# Patient Record
Sex: FEMALE | Race: WHITE | NOT HISPANIC OR LATINO | Employment: FULL TIME | URBAN - NONMETROPOLITAN AREA
[De-identification: names, ages, dates, MRNs, and addresses within clinical notes are randomized per-mention and may not be internally consistent; named-entity substitution may affect disease eponyms.]

---

## 2018-03-13 ENCOUNTER — LAB (OUTPATIENT)
Dept: LAB | Facility: HOSPITAL | Age: 58
End: 2018-03-13

## 2018-03-13 ENCOUNTER — TRANSCRIBE ORDERS (OUTPATIENT)
Dept: ADMINISTRATIVE | Facility: HOSPITAL | Age: 58
End: 2018-03-13

## 2018-03-13 DIAGNOSIS — I13.10 BENIGN HYPERTENSIVE HEART AND RENAL DISEASE: ICD-10-CM

## 2018-03-13 DIAGNOSIS — I13.10 BENIGN HYPERTENSIVE HEART AND RENAL DISEASE: Primary | ICD-10-CM

## 2018-03-13 LAB
ALBUMIN SERPL-MCNC: 4.5 G/DL (ref 3.5–5)
ANION GAP SERPL CALCULATED.3IONS-SCNC: 19.7 MMOL/L
BACTERIA UR QL AUTO: ABNORMAL /HPF
BASOPHILS # BLD AUTO: 0.05 10*3/MM3 (ref 0–0.2)
BASOPHILS NFR BLD AUTO: 0.5 % (ref 0–2.5)
BILIRUB UR QL STRIP: NEGATIVE
BUN BLD-MCNC: 15 MG/DL (ref 7–20)
BUN/CREAT SERPL: 12.5 (ref 7.1–23.5)
CALCIUM SPEC-SCNC: 9.8 MG/DL (ref 8.4–10.2)
CHLORIDE SERPL-SCNC: 99 MMOL/L (ref 98–107)
CLARITY UR: CLEAR
CO2 SERPL-SCNC: 30 MMOL/L (ref 26–30)
COLOR UR: YELLOW
CREAT BLD-MCNC: 1.2 MG/DL (ref 0.6–1.3)
CREAT UR-MCNC: 76.2 MG/DL
DEPRECATED RDW RBC AUTO: 41.2 FL (ref 37–54)
EOSINOPHIL # BLD AUTO: 0.16 10*3/MM3 (ref 0–0.7)
EOSINOPHIL NFR BLD AUTO: 1.6 % (ref 0–7)
ERYTHROCYTE [DISTWIDTH] IN BLOOD BY AUTOMATED COUNT: 12.9 % (ref 11.5–14.5)
GFR SERPL CREATININE-BSD FRML MDRD: 46 ML/MIN/1.73
GLUCOSE BLD-MCNC: 88 MG/DL (ref 74–98)
GLUCOSE UR STRIP-MCNC: NEGATIVE MG/DL
HCT VFR BLD AUTO: 34.1 % (ref 37–47)
HGB BLD-MCNC: 11.2 G/DL (ref 12–16)
HGB UR QL STRIP.AUTO: NEGATIVE
HYALINE CASTS UR QL AUTO: ABNORMAL /LPF
IMM GRANULOCYTES # BLD: 0.06 10*3/MM3 (ref 0–0.06)
IMM GRANULOCYTES NFR BLD: 0.6 % (ref 0–0.6)
KETONES UR QL STRIP: NEGATIVE
LEUKOCYTE ESTERASE UR QL STRIP.AUTO: NEGATIVE
LYMPHOCYTES # BLD AUTO: 2.17 10*3/MM3 (ref 0.6–3.4)
LYMPHOCYTES NFR BLD AUTO: 22.1 % (ref 10–50)
MCH RBC QN AUTO: 28.9 PG (ref 27–31)
MCHC RBC AUTO-ENTMCNC: 32.8 G/DL (ref 30–37)
MCV RBC AUTO: 88.1 FL (ref 81–99)
MONOCYTES # BLD AUTO: 0.51 10*3/MM3 (ref 0–0.9)
MONOCYTES NFR BLD AUTO: 5.2 % (ref 0–12)
NEUTROPHILS # BLD AUTO: 6.89 10*3/MM3 (ref 2–6.9)
NEUTROPHILS NFR BLD AUTO: 70 % (ref 37–80)
NITRITE UR QL STRIP: NEGATIVE
NRBC BLD MANUAL-RTO: 0 /100 WBC (ref 0–0)
PH UR STRIP.AUTO: <=5 [PH] (ref 5–8)
PHOSPHATE SERPL-MCNC: 4.8 MG/DL (ref 2.5–4.5)
PLATELET # BLD AUTO: 354 10*3/MM3 (ref 130–400)
PMV BLD AUTO: 9.5 FL (ref 6–12)
POTASSIUM BLD-SCNC: 3.7 MMOL/L (ref 3.5–5.1)
PROT UR QL STRIP: NEGATIVE
PROT UR-MCNC: 10 MG/DL (ref 0–11.9)
RBC # BLD AUTO: 3.87 10*6/MM3 (ref 4.2–5.4)
RBC # UR: ABNORMAL /HPF
REF LAB TEST METHOD: ABNORMAL
SODIUM BLD-SCNC: 145 MMOL/L (ref 137–145)
SP GR UR STRIP: 1.01 (ref 1–1.03)
SQUAMOUS #/AREA URNS HPF: ABNORMAL /HPF
UROBILINOGEN UR QL STRIP: NORMAL
WBC NRBC COR # BLD: 9.84 10*3/MM3 (ref 4.8–10.8)
WBC UR QL AUTO: ABNORMAL /HPF

## 2018-03-13 PROCEDURE — 84156 ASSAY OF PROTEIN URINE: CPT

## 2018-03-13 PROCEDURE — 80069 RENAL FUNCTION PANEL: CPT | Performed by: INTERNAL MEDICINE

## 2018-03-13 PROCEDURE — 85025 COMPLETE CBC W/AUTO DIFF WBC: CPT | Performed by: INTERNAL MEDICINE

## 2018-03-13 PROCEDURE — 81001 URINALYSIS AUTO W/SCOPE: CPT

## 2018-03-13 PROCEDURE — 36415 COLL VENOUS BLD VENIPUNCTURE: CPT | Performed by: INTERNAL MEDICINE

## 2018-03-13 PROCEDURE — 82570 ASSAY OF URINE CREATININE: CPT

## 2018-03-20 ENCOUNTER — TRANSCRIBE ORDERS (OUTPATIENT)
Dept: ULTRASOUND IMAGING | Facility: HOSPITAL | Age: 58
End: 2018-03-20

## 2018-03-20 DIAGNOSIS — N18.30 CHRONIC KIDNEY DISEASE, STAGE III (MODERATE) (HCC): Primary | ICD-10-CM

## 2018-03-22 ENCOUNTER — HOSPITAL ENCOUNTER (OUTPATIENT)
Dept: ULTRASOUND IMAGING | Facility: HOSPITAL | Age: 58
Discharge: HOME | End: 2018-03-22
Admitting: INTERNAL MEDICINE

## 2018-03-22 DIAGNOSIS — N18.30 CHRONIC KIDNEY DISEASE, STAGE III (MODERATE) (HCC): ICD-10-CM

## 2018-03-22 PROCEDURE — 76775 US EXAM ABDO BACK WALL LIM: CPT

## 2018-05-24 ENCOUNTER — APPOINTMENT (OUTPATIENT)
Dept: LAB | Facility: HOSPITAL | Age: 58
End: 2018-05-24

## 2018-05-24 ENCOUNTER — TRANSCRIBE ORDERS (OUTPATIENT)
Dept: ADMINISTRATIVE | Facility: HOSPITAL | Age: 58
End: 2018-05-24

## 2018-05-24 DIAGNOSIS — N18.30 CHRONIC KIDNEY DISEASE, STAGE III (MODERATE) (HCC): Primary | ICD-10-CM

## 2018-05-24 LAB
25(OH)D3 SERPL-MCNC: 33.2 NG/ML
ALBUMIN SERPL-MCNC: 4.6 G/DL (ref 3.5–5)
AMORPH URATE CRY URNS QL MICRO: ABNORMAL /HPF
ANION GAP SERPL CALCULATED.3IONS-SCNC: 17.6 MMOL/L (ref 10–20)
BACTERIA UR QL AUTO: ABNORMAL /HPF
BILIRUB UR QL STRIP: ABNORMAL
BUN BLD-MCNC: 27 MG/DL (ref 7–20)
BUN/CREAT SERPL: 24.5 (ref 7.1–23.5)
CALCIUM SPEC-SCNC: 9.4 MG/DL (ref 8.4–10.2)
CHLORIDE SERPL-SCNC: 98 MMOL/L (ref 98–107)
CHOLEST SERPL-MCNC: 163 MG/DL (ref 0–199)
CLARITY UR: CLEAR
CO2 SERPL-SCNC: 28 MMOL/L (ref 26–30)
COLOR UR: YELLOW
CREAT BLD-MCNC: 1.1 MG/DL (ref 0.6–1.3)
CREAT UR-MCNC: 230.6 MG/DL
FERRITIN SERPL-MCNC: 22.7 NG/ML (ref 11.1–264)
FOLATE SERPL-MCNC: 11.6 NG/ML
GFR SERPL CREATININE-BSD FRML MDRD: 51 ML/MIN/1.73
GLUCOSE BLD-MCNC: 149 MG/DL (ref 74–98)
GLUCOSE UR STRIP-MCNC: NEGATIVE MG/DL
HBA1C MFR BLD: 6.4 % (ref 3–6)
HDLC SERPL-MCNC: 33 MG/DL (ref 40–60)
HGB UR QL STRIP.AUTO: NEGATIVE
HYALINE CASTS UR QL AUTO: ABNORMAL /LPF
IRON 24H UR-MRATE: 32 MCG/DL (ref 37–181)
IRON SATN MFR SERPL: 8 % (ref 11–46)
KETONES UR QL STRIP: ABNORMAL
LDLC SERPL CALC-MCNC: 87 MG/DL (ref 0–99)
LDLC/HDLC SERPL: 2.64 {RATIO}
LEUKOCYTE ESTERASE UR QL STRIP.AUTO: NEGATIVE
MUCOUS THREADS URNS QL MICRO: ABNORMAL /HPF
NITRITE UR QL STRIP: NEGATIVE
PH UR STRIP.AUTO: <=5 [PH] (ref 5–8)
PHOSPHATE SERPL-MCNC: 4.1 MG/DL (ref 2.5–4.5)
POTASSIUM BLD-SCNC: 3.6 MMOL/L (ref 3.5–5.1)
PROT UR QL STRIP: NEGATIVE
RBC # UR: ABNORMAL /HPF
REF LAB TEST METHOD: ABNORMAL
SODIUM BLD-SCNC: 140 MMOL/L (ref 137–145)
SP GR UR STRIP: 1.02 (ref 1–1.03)
SQUAMOUS #/AREA URNS HPF: ABNORMAL /HPF
TIBC SERPL-MCNC: 401 MCG/DL (ref 261–497)
TRIGL SERPL-MCNC: 215 MG/DL
UROBILINOGEN UR QL STRIP: ABNORMAL
VIT B12 BLD-MCNC: 355 PG/ML (ref 239–931)
VLDLC SERPL-MCNC: 43 MG/DL
WBC UR QL AUTO: ABNORMAL /HPF

## 2018-05-24 PROCEDURE — 80061 LIPID PANEL: CPT | Performed by: INTERNAL MEDICINE

## 2018-05-24 PROCEDURE — 84156 ASSAY OF PROTEIN URINE: CPT | Performed by: INTERNAL MEDICINE

## 2018-05-24 PROCEDURE — 82570 ASSAY OF URINE CREATININE: CPT | Performed by: INTERNAL MEDICINE

## 2018-05-24 PROCEDURE — 83970 ASSAY OF PARATHORMONE: CPT | Performed by: INTERNAL MEDICINE

## 2018-05-24 PROCEDURE — 86334 IMMUNOFIX E-PHORESIS SERUM: CPT | Performed by: INTERNAL MEDICINE

## 2018-05-24 PROCEDURE — 82043 UR ALBUMIN QUANTITATIVE: CPT | Performed by: INTERNAL MEDICINE

## 2018-05-24 PROCEDURE — 82784 ASSAY IGA/IGD/IGG/IGM EACH: CPT | Performed by: INTERNAL MEDICINE

## 2018-05-24 PROCEDURE — 82306 VITAMIN D 25 HYDROXY: CPT | Performed by: INTERNAL MEDICINE

## 2018-05-24 PROCEDURE — 81001 URINALYSIS AUTO W/SCOPE: CPT | Performed by: INTERNAL MEDICINE

## 2018-05-24 PROCEDURE — 83540 ASSAY OF IRON: CPT | Performed by: INTERNAL MEDICINE

## 2018-05-24 PROCEDURE — 83036 HEMOGLOBIN GLYCOSYLATED A1C: CPT | Performed by: INTERNAL MEDICINE

## 2018-05-24 PROCEDURE — 84166 PROTEIN E-PHORESIS/URINE/CSF: CPT | Performed by: INTERNAL MEDICINE

## 2018-05-24 PROCEDURE — 83550 IRON BINDING TEST: CPT | Performed by: INTERNAL MEDICINE

## 2018-05-24 PROCEDURE — 86335 IMMUNFIX E-PHORSIS/URINE/CSF: CPT | Performed by: INTERNAL MEDICINE

## 2018-05-24 PROCEDURE — 80069 RENAL FUNCTION PANEL: CPT | Performed by: INTERNAL MEDICINE

## 2018-05-24 PROCEDURE — 82607 VITAMIN B-12: CPT | Performed by: INTERNAL MEDICINE

## 2018-05-24 PROCEDURE — 84165 PROTEIN E-PHORESIS SERUM: CPT | Performed by: INTERNAL MEDICINE

## 2018-05-24 PROCEDURE — 84155 ASSAY OF PROTEIN SERUM: CPT | Performed by: INTERNAL MEDICINE

## 2018-05-24 PROCEDURE — 36415 COLL VENOUS BLD VENIPUNCTURE: CPT | Performed by: INTERNAL MEDICINE

## 2018-05-24 PROCEDURE — 82746 ASSAY OF FOLIC ACID SERUM: CPT | Performed by: INTERNAL MEDICINE

## 2018-05-24 PROCEDURE — 82728 ASSAY OF FERRITIN: CPT | Performed by: INTERNAL MEDICINE

## 2018-05-25 LAB
ALBUMIN SERPL-MCNC: 3.8 G/DL (ref 2.9–4.4)
ALBUMIN/GLOB SERPL: 1.2 {RATIO} (ref 0.7–1.7)
ALPHA1 GLOB FLD ELPH-MCNC: 0.3 G/DL (ref 0–0.4)
ALPHA2 GLOB SERPL ELPH-MCNC: 0.8 G/DL (ref 0.4–1)
B-GLOBULIN SERPL ELPH-MCNC: 1.2 G/DL (ref 0.7–1.3)
GAMMA GLOB SERPL ELPH-MCNC: 1.1 G/DL (ref 0.4–1.8)
GLOBULIN SER CALC-MCNC: 3.4 G/DL (ref 2.2–3.9)
IGA SERPL-MCNC: 153 MG/DL (ref 87–352)
IGG SERPL-MCNC: 905 MG/DL (ref 700–1600)
IGM SERPL-MCNC: 171 MG/DL (ref 26–217)
INTERPRETATION SERPL IEP-IMP: NORMAL
Lab: NORMAL
M-SPIKE: NORMAL G/DL
MICROALBUMIN UR-MCNC: 15.7 UG/ML
PROT SERPL-MCNC: 7.2 G/DL (ref 6–8.5)
PTH-INTACT SERPL-MCNC: 27 PG/ML (ref 15–65)

## 2018-05-29 LAB
ALBUMIN 24H MFR UR ELPH: 48.8 %
ALPHA1 GLOB 24H MFR UR ELPH: 2.2 %
ALPHA2 GLOB 24H MFR UR ELPH: 10.2 %
B-GLOBULIN MFR UR ELPH: 23.2 %
GAMMA GLOB 24H MFR UR ELPH: 15.5 %
HIV 1 & 2 AB SER-IMP: NORMAL
INTERPRETATION UR IFE-IMP: NORMAL
M PROTEIN 24H MFR UR ELPH: NORMAL %
PROT UR-MCNC: 7.9 MG/DL

## 2018-06-06 PROBLEM — D50.9 IRON DEFICIENCY ANEMIA: Status: ACTIVE | Noted: 2018-06-06

## 2018-06-06 PROBLEM — N18.30 CKD (CHRONIC KIDNEY DISEASE), STAGE III: Status: ACTIVE | Noted: 2018-06-06

## 2018-06-07 ENCOUNTER — HOSPITAL ENCOUNTER (OUTPATIENT)
Dept: INFUSION THERAPY | Facility: HOSPITAL | Age: 58
Setting detail: INFUSION SERIES
Discharge: HOME | End: 2018-06-07

## 2018-06-07 VITALS
WEIGHT: 187 LBS | DIASTOLIC BLOOD PRESSURE: 61 MMHG | RESPIRATION RATE: 16 BRPM | BODY MASS INDEX: 31.16 KG/M2 | TEMPERATURE: 97.8 F | HEART RATE: 68 BPM | SYSTOLIC BLOOD PRESSURE: 100 MMHG | HEIGHT: 65 IN | OXYGEN SATURATION: 97 %

## 2018-06-07 DIAGNOSIS — N18.30 CKD (CHRONIC KIDNEY DISEASE), STAGE III (HCC): ICD-10-CM

## 2018-06-07 DIAGNOSIS — D50.9 IRON DEFICIENCY ANEMIA, UNSPECIFIED IRON DEFICIENCY ANEMIA TYPE: ICD-10-CM

## 2018-06-07 PROCEDURE — 96374 THER/PROPH/DIAG INJ IV PUSH: CPT

## 2018-06-07 PROCEDURE — 25010000002 FERUMOXYTOL 510 MG/17ML SOLUTION 510 MG VIAL: Performed by: INTERNAL MEDICINE

## 2018-06-07 RX ORDER — ATORVASTATIN CALCIUM 40 MG/1
40 TABLET, FILM COATED ORAL DAILY
COMMUNITY
End: 2021-10-11 | Stop reason: HOSPADM

## 2018-06-07 RX ORDER — LOPERAMIDE HYDROCHLORIDE 2 MG/1
2 CAPSULE ORAL 4 TIMES DAILY PRN
COMMUNITY

## 2018-06-07 RX ORDER — RABEPRAZOLE SODIUM 20 MG/1
20 TABLET, DELAYED RELEASE ORAL 2 TIMES DAILY
COMMUNITY

## 2018-06-07 RX ORDER — VALACYCLOVIR HYDROCHLORIDE 1 G/1
1000 TABLET, FILM COATED ORAL 2 TIMES DAILY PRN
COMMUNITY

## 2018-06-07 RX ORDER — GABAPENTIN 300 MG/1
300 CAPSULE ORAL 3 TIMES DAILY
COMMUNITY
End: 2021-10-11 | Stop reason: HOSPADM

## 2018-06-07 RX ORDER — FLUOXETINE HYDROCHLORIDE 20 MG/1
60 CAPSULE ORAL DAILY
COMMUNITY
End: 2022-03-10

## 2018-06-07 RX ORDER — SODIUM CHLORIDE 9 MG/ML
250 INJECTION, SOLUTION INTRAVENOUS ONCE
Status: CANCELLED | OUTPATIENT
Start: 2018-06-07

## 2018-06-07 RX ORDER — LISINOPRIL 10 MG/1
10 TABLET ORAL DAILY
Status: ON HOLD | COMMUNITY
End: 2021-10-11 | Stop reason: SDUPTHER

## 2018-06-07 RX ORDER — CLOBETASOL PROPIONATE 0.5 MG/G
OINTMENT TOPICAL AS NEEDED
COMMUNITY

## 2018-06-07 RX ORDER — ESTRADIOL 0.07 MG/D
1 FILM, EXTENDED RELEASE TRANSDERMAL 2 TIMES WEEKLY
COMMUNITY

## 2018-06-07 RX ORDER — SODIUM CHLORIDE 9 MG/ML
250 INJECTION, SOLUTION INTRAVENOUS ONCE
Status: DISCONTINUED | OUTPATIENT
Start: 2018-06-07 | End: 2018-06-09 | Stop reason: HOSPADM

## 2018-06-07 RX ORDER — FENOFIBRATE 54 MG/1
54 TABLET ORAL DAILY
COMMUNITY
End: 2022-03-10

## 2018-06-07 RX ORDER — FLUTICASONE PROPIONATE 50 MCG
2 SPRAY, SUSPENSION (ML) NASAL DAILY
COMMUNITY

## 2018-06-07 RX ORDER — LEVOTHYROXINE SODIUM 0.07 MG/1
75 TABLET ORAL DAILY
COMMUNITY

## 2018-06-07 RX ORDER — CETIRIZINE HYDROCHLORIDE 10 MG/1
10 TABLET ORAL 2 TIMES DAILY
COMMUNITY

## 2018-06-07 RX ADMIN — FERUMOXYTOL 510 MG: 510 INJECTION INTRAVENOUS at 14:07

## 2018-06-07 NOTE — PATIENT INSTRUCTIONS
Post IV Iron Infusion    Notify your doctor/practitioner or come to the emergency room if the following occurs:    1.  Rash  2.  Nausea and vomiting  3.  Diarrhea   4.  Blood in your urine  5.  Rapid heartbeat   6.  Chest pain  7.  Breathing difficulty  8.  Chest pain / tightness  9.  Any other unusual symptoms  10.  Decrease of blood pressure (if Ferumoxytol was given)          Ferumoxytol injection  What is this medicine?  FERUMOXYTOL is an iron complex. Iron is used to make healthy red blood cells, which carry oxygen and nutrients throughout the body. This medicine is used to treat iron deficiency anemia in people with chronic kidney disease.  This medicine may be used for other purposes; ask your health care provider or pharmacist if you have questions.  COMMON BRAND NAME(S): Feraheme  What should I tell my health care provider before I take this medicine?  They need to know if you have any of these conditions:  -anemia not caused by low iron levels  -high levels of iron in the blood  -magnetic resonance imaging (MRI) test scheduled  -an unusual or allergic reaction to iron, other medicines, foods, dyes, or preservatives  -pregnant or trying to get pregnant  -breast-feeding  How should I use this medicine?  This medicine is for injection into a vein. It is given by a health care professional in a hospital or clinic setting.  Talk to your pediatrician regarding the use of this medicine in children. Special care may be needed.  Overdosage: If you think you have taken too much of this medicine contact a poison control center or emergency room at once.  NOTE: This medicine is only for you. Do not share this medicine with others.  What if I miss a dose?  It is important not to miss your dose. Call your doctor or health care professional if you are unable to keep an appointment.  What may interact with this medicine?  This medicine may interact with the following medications:  -other iron products  This list may not  describe all possible interactions. Give your health care provider a list of all the medicines, herbs, non-prescription drugs, or dietary supplements you use. Also tell them if you smoke, drink alcohol, or use illegal drugs. Some items may interact with your medicine.  What should I watch for while using this medicine?  Visit your doctor or healthcare professional regularly. Tell your doctor or healthcare professional if your symptoms do not start to get better or if they get worse. You may need blood work done while you are taking this medicine.  You may need to follow a special diet. Talk to your doctor. Foods that contain iron include: whole grains/cereals, dried fruits, beans, or peas, leafy green vegetables, and organ meats (liver, kidney).  What side effects may I notice from receiving this medicine?  Side effects that you should report to your doctor or health care professional as soon as possible:  -allergic reactions like skin rash, itching or hives, swelling of the face, lips, or tongue  -breathing problems  -changes in blood pressure  -feeling faint or lightheaded, falls  -fever or chills  -flushing, sweating, or hot feelings  -swelling of the ankles or feet  Side effects that usually do not require medical attention (report to your doctor or health care professional if they continue or are bothersome):  -diarrhea  -headache  -nausea, vomiting  -stomach pain  This list may not describe all possible side effects. Call your doctor for medical advice about side effects. You may report side effects to FDA at 3-763-FDA-7928.  Where should I keep my medicine?  This drug is given in a hospital or clinic and will not be stored at home.  NOTE: This sheet is a summary. It may not cover all possible information. If you have questions about this medicine, talk to your doctor, pharmacist, or health care provider.  © 2018 Elsevier/Gold Standard (2017-01-19 12:41:49)

## 2018-06-11 ENCOUNTER — HOSPITAL ENCOUNTER (OUTPATIENT)
Dept: INFUSION THERAPY | Facility: HOSPITAL | Age: 58
Setting detail: INFUSION SERIES
Discharge: HOME | End: 2018-06-11

## 2018-06-11 VITALS
SYSTOLIC BLOOD PRESSURE: 104 MMHG | RESPIRATION RATE: 20 BRPM | OXYGEN SATURATION: 96 % | HEART RATE: 74 BPM | DIASTOLIC BLOOD PRESSURE: 53 MMHG | TEMPERATURE: 98.9 F

## 2018-06-11 DIAGNOSIS — N18.30 CKD (CHRONIC KIDNEY DISEASE), STAGE III (HCC): ICD-10-CM

## 2018-06-11 DIAGNOSIS — D50.9 IRON DEFICIENCY ANEMIA, UNSPECIFIED IRON DEFICIENCY ANEMIA TYPE: ICD-10-CM

## 2018-06-11 PROCEDURE — 96374 THER/PROPH/DIAG INJ IV PUSH: CPT

## 2018-06-11 PROCEDURE — 25010000002 FERUMOXYTOL 510 MG/17ML SOLUTION 510 MG VIAL: Performed by: INTERNAL MEDICINE

## 2018-06-11 RX ORDER — SODIUM CHLORIDE 9 MG/ML
250 INJECTION, SOLUTION INTRAVENOUS ONCE
Status: DISCONTINUED | OUTPATIENT
Start: 2018-06-11 | End: 2018-06-13 | Stop reason: HOSPADM

## 2018-06-11 RX ORDER — SODIUM CHLORIDE 9 MG/ML
250 INJECTION, SOLUTION INTRAVENOUS ONCE
Status: CANCELLED | OUTPATIENT
Start: 2018-06-11

## 2018-06-11 RX ADMIN — FERUMOXYTOL 510 MG: 510 INJECTION INTRAVENOUS at 10:45

## 2018-07-03 ENCOUNTER — HOSPITAL ENCOUNTER (OUTPATIENT)
Dept: ULTRASOUND IMAGING | Facility: HOSPITAL | Age: 58
Discharge: HOME | End: 2018-07-03
Admitting: PHYSICIAN ASSISTANT

## 2018-07-03 ENCOUNTER — TRANSCRIBE ORDERS (OUTPATIENT)
Dept: ADMINISTRATIVE | Facility: HOSPITAL | Age: 58
End: 2018-07-03

## 2018-07-03 DIAGNOSIS — R22.9 LOCALIZED SUPERFICIAL SWELLING, MASS, OR LUMP: ICD-10-CM

## 2018-07-03 DIAGNOSIS — R22.9 LOCALIZED SUPERFICIAL SWELLING, MASS, OR LUMP: Primary | ICD-10-CM

## 2018-07-03 PROCEDURE — 76999 ECHO EXAMINATION PROCEDURE: CPT

## 2018-07-23 ENCOUNTER — TRANSCRIBE ORDERS (OUTPATIENT)
Dept: ADMINISTRATIVE | Facility: HOSPITAL | Age: 58
End: 2018-07-23

## 2018-07-23 ENCOUNTER — HOSPITAL ENCOUNTER (OUTPATIENT)
Dept: GENERAL RADIOLOGY | Facility: HOSPITAL | Age: 58
Discharge: HOME | End: 2018-07-23

## 2018-07-23 ENCOUNTER — HOSPITAL ENCOUNTER (OUTPATIENT)
Dept: GENERAL RADIOLOGY | Facility: HOSPITAL | Age: 58
Discharge: HOME | End: 2018-07-23
Admitting: ANESTHESIOLOGY

## 2018-07-23 DIAGNOSIS — M25.561 PAIN IN BOTH KNEES, UNSPECIFIED CHRONICITY: Primary | ICD-10-CM

## 2018-07-23 DIAGNOSIS — M25.562 PAIN IN BOTH KNEES, UNSPECIFIED CHRONICITY: Primary | ICD-10-CM

## 2018-07-23 PROCEDURE — 73562 X-RAY EXAM OF KNEE 3: CPT

## 2018-12-04 ENCOUNTER — LAB (OUTPATIENT)
Dept: LAB | Facility: HOSPITAL | Age: 58
End: 2018-12-04

## 2018-12-04 ENCOUNTER — TRANSCRIBE ORDERS (OUTPATIENT)
Dept: ADMINISTRATIVE | Facility: HOSPITAL | Age: 58
End: 2018-12-04

## 2018-12-04 DIAGNOSIS — N18.30 CHRONIC KIDNEY DISEASE, STAGE III (MODERATE) (HCC): Primary | ICD-10-CM

## 2018-12-04 DIAGNOSIS — N18.30 CHRONIC KIDNEY DISEASE, STAGE III (MODERATE) (HCC): ICD-10-CM

## 2018-12-04 LAB
ALBUMIN SERPL-MCNC: 4.7 G/DL (ref 3.5–5)
ANION GAP SERPL CALCULATED.3IONS-SCNC: 10.5 MMOL/L (ref 10–20)
BACTERIA UR QL AUTO: ABNORMAL /HPF
BASOPHILS # BLD AUTO: 0.03 10*3/MM3 (ref 0–0.2)
BASOPHILS NFR BLD AUTO: 0.4 % (ref 0–2.5)
BILIRUB UR QL STRIP: NEGATIVE
BUN BLD-MCNC: 15 MG/DL (ref 7–20)
BUN/CREAT SERPL: 21.4 (ref 7.1–23.5)
CALCIUM SPEC-SCNC: 9.7 MG/DL (ref 8.4–10.2)
CHLORIDE SERPL-SCNC: 99 MMOL/L (ref 98–107)
CLARITY UR: ABNORMAL
CO2 SERPL-SCNC: 32 MMOL/L (ref 26–30)
COLOR UR: YELLOW
CREAT BLD-MCNC: 0.7 MG/DL (ref 0.6–1.3)
DEPRECATED RDW RBC AUTO: 39 FL (ref 37–54)
EOSINOPHIL # BLD AUTO: 0.09 10*3/MM3 (ref 0–0.7)
EOSINOPHIL NFR BLD AUTO: 1.2 % (ref 0–7)
ERYTHROCYTE [DISTWIDTH] IN BLOOD BY AUTOMATED COUNT: 11.7 % (ref 11.5–14.5)
FERRITIN SERPL-MCNC: 156 NG/ML (ref 11.1–264)
GFR SERPL CREATININE-BSD FRML MDRD: 86 ML/MIN/1.73
GLUCOSE BLD-MCNC: 77 MG/DL (ref 74–98)
GLUCOSE UR STRIP-MCNC: NEGATIVE MG/DL
HCT VFR BLD AUTO: 35.6 % (ref 37–47)
HGB BLD-MCNC: 11.8 G/DL (ref 12–16)
HGB UR QL STRIP.AUTO: NEGATIVE
HYALINE CASTS UR QL AUTO: ABNORMAL /LPF
IMM GRANULOCYTES # BLD: 0.02 10*3/MM3 (ref 0–0.06)
IMM GRANULOCYTES NFR BLD: 0.3 % (ref 0–0.6)
IRON 24H UR-MRATE: 57 MCG/DL (ref 37–181)
IRON SATN MFR SERPL: 18 % (ref 11–46)
KETONES UR QL STRIP: NEGATIVE
LEUKOCYTE ESTERASE UR QL STRIP.AUTO: NEGATIVE
LYMPHOCYTES # BLD AUTO: 2.3 10*3/MM3 (ref 0.6–3.4)
LYMPHOCYTES NFR BLD AUTO: 29.9 % (ref 10–50)
MCH RBC QN AUTO: 30.3 PG (ref 27–31)
MCHC RBC AUTO-ENTMCNC: 33.1 G/DL (ref 30–37)
MCV RBC AUTO: 91.3 FL (ref 81–99)
MONOCYTES # BLD AUTO: 0.53 10*3/MM3 (ref 0–0.9)
MONOCYTES NFR BLD AUTO: 6.9 % (ref 0–12)
NEUTROPHILS # BLD AUTO: 4.73 10*3/MM3 (ref 2–6.9)
NEUTROPHILS NFR BLD AUTO: 61.3 % (ref 37–80)
NITRITE UR QL STRIP: NEGATIVE
NRBC BLD MANUAL-RTO: 0 /100 WBC (ref 0–0)
PH UR STRIP.AUTO: 5.5 [PH] (ref 5–8)
PHOSPHATE SERPL-MCNC: 3.8 MG/DL (ref 2.5–4.5)
PLATELET # BLD AUTO: 318 10*3/MM3 (ref 130–400)
PMV BLD AUTO: 9.3 FL (ref 6–12)
POTASSIUM BLD-SCNC: 3.5 MMOL/L (ref 3.5–5.1)
PROT UR QL STRIP: NEGATIVE
RBC # BLD AUTO: 3.9 10*6/MM3 (ref 4.2–5.4)
RBC # UR: ABNORMAL /HPF
REF LAB TEST METHOD: ABNORMAL
SODIUM BLD-SCNC: 138 MMOL/L (ref 137–145)
SP GR UR STRIP: 1.02 (ref 1–1.03)
SQUAMOUS #/AREA URNS HPF: ABNORMAL /HPF
TIBC SERPL-MCNC: 309 MCG/DL (ref 261–497)
UROBILINOGEN UR QL STRIP: ABNORMAL
WBC NRBC COR # BLD: 7.7 10*3/MM3 (ref 4.8–10.8)
WBC UR QL AUTO: ABNORMAL /HPF

## 2018-12-04 PROCEDURE — 81001 URINALYSIS AUTO W/SCOPE: CPT | Performed by: INTERNAL MEDICINE

## 2018-12-04 PROCEDURE — 83540 ASSAY OF IRON: CPT

## 2018-12-04 PROCEDURE — 82728 ASSAY OF FERRITIN: CPT | Performed by: INTERNAL MEDICINE

## 2018-12-04 PROCEDURE — 80069 RENAL FUNCTION PANEL: CPT | Performed by: INTERNAL MEDICINE

## 2018-12-04 PROCEDURE — 36415 COLL VENOUS BLD VENIPUNCTURE: CPT | Performed by: INTERNAL MEDICINE

## 2018-12-04 PROCEDURE — 83550 IRON BINDING TEST: CPT

## 2018-12-04 PROCEDURE — 85025 COMPLETE CBC W/AUTO DIFF WBC: CPT

## 2019-03-11 ENCOUNTER — OFFICE VISIT (OUTPATIENT)
Dept: PSYCHIATRY | Facility: CLINIC | Age: 59
End: 2019-03-11

## 2019-03-11 DIAGNOSIS — F41.9 ANXIETY AND DEPRESSION: ICD-10-CM

## 2019-03-11 DIAGNOSIS — G89.29 OTHER CHRONIC PAIN: Primary | ICD-10-CM

## 2019-03-11 DIAGNOSIS — F32.A ANXIETY AND DEPRESSION: ICD-10-CM

## 2019-03-11 PROCEDURE — 90791 PSYCH DIAGNOSTIC EVALUATION: CPT | Performed by: PSYCHOLOGIST

## 2019-03-12 NOTE — PROGRESS NOTES
PROGRESS NOTE    Data:  Estrella Recio is a 58 y.o. female who met with the undersigned for a scheduled individual outpatient therapy session from 9:31 - 10:15am.      Clinical Maneuvering/Intervention:      Pt talked about struggling with chronic pain. She voiced wanting to seek out additional options for pain treatment with her pain physician and that she struggles emotionally with the pain/fear of not being able to continue working due to pain. A psychological evaluation was conducted in order to assess past and current level of functioning. Areas assessed included, but were not limited to: perception of social support, perception of ability to face and deal with challenges in life (positive functioning), anxiety symptoms, depressive symptoms, perspective on beliefs/belief system, coping skills for stress, intelligence level, addiction issues, etc. Therapeutic rapport was established. Interventions conducted today were geared towards pain symptom management, identifying/starting to change maladaptive thoughts into more realistic ones, and identifying any other potential counseling needs. She talked about feeling sad, down, and at times, hopeless that her health status will improve. Positive and strength-based therapy was initiated. The intervention of where things seem helpless at first, that they may not be, was conducted. perspective taking was conducted. She was assisted in changing thoughts from 'there seems like there is nothing I can do,' to 'let us see what you can do' in order to improve the quality of your life. with some guidance, she was able to come up with several options: as her pain physician about options for pain treatment, lean on loved ones/very supportive , improve her diet to include more anti-inflammatory foods, lose some weight, etc. Education was also provided as to the nature of counseling and what to expect in subsequent sessions. A treatment plan was initiated tailored to meeting  pt’s presenting needs. The pt was encouraged to return for additional sessions and agreed to do so.             Mental Status Exam  Hygiene:  good  Dress: normal  Attitude:  cooperative and proactive  Motor Activity: normal  Speech: normal  Mood:  anxious and depressed  Affect:  congruent  Thought Processes: normal  Thought Content:  normal  Suicidal Thoughts:  not endorsed  Homicidal Thoughts:  not endorsed  Crisis Safety Plan: not needed   Hallucinations:  none      Patient's Support Network Includes:  family, friends      Progress toward goal: there is evidence to suggest that she is taking measures to improve the quality of her life including seeking counseling and being open to the process.      Functional Status: moderate      Prognosis: good    Assessment      The pt presented to be suffering from chronic pain, anxiety (exceesive worry without taking enough action or knowing what actions to take in order to absolve her problems), and depression (mainly due to a sense of helplessness and hopelessness that problems will not ameliorate).     Should the pt be a candidate for additional pain procedures, such as a spinal cord stimulator or pain pump, she would be a good candidate for these in a psychological sense as she seems ready for trying such additional options and seems to trust her pain physician/current pain specialists.       Plan      In order to diminish symptoms of anxiety, depression, and pain, she is to follow up with her pain treatment team in order to get questions answered regarding additional pain treatment. She is to continue with counseling on a regular basis, at least one session per month, or more frequently should she find a counselor closer to her home in [place], [place] (ongoing).    Chasity Bermeo, PhD, LP

## 2019-04-22 ENCOUNTER — TRANSCRIBE ORDERS (OUTPATIENT)
Dept: ADMINISTRATIVE | Facility: HOSPITAL | Age: 59
End: 2019-04-22

## 2019-04-22 DIAGNOSIS — Z78.0 MENOPAUSE: Primary | ICD-10-CM

## 2019-05-10 ENCOUNTER — APPOINTMENT (OUTPATIENT)
Dept: BONE DENSITY | Facility: HOSPITAL | Age: 59
End: 2019-05-10

## 2019-05-10 DIAGNOSIS — Z78.0 MENOPAUSE: ICD-10-CM

## 2019-05-10 PROCEDURE — 77080 DXA BONE DENSITY AXIAL: CPT

## 2019-06-06 ENCOUNTER — LAB (OUTPATIENT)
Dept: LAB | Facility: HOSPITAL | Age: 59
End: 2019-06-06

## 2019-06-06 ENCOUNTER — TRANSCRIBE ORDERS (OUTPATIENT)
Dept: LAB | Facility: HOSPITAL | Age: 59
End: 2019-06-06

## 2019-06-06 DIAGNOSIS — N18.9 CHRONIC KIDNEY DISEASE, UNSPECIFIED CKD STAGE: ICD-10-CM

## 2019-06-06 DIAGNOSIS — N18.30 CHRONIC KIDNEY DISEASE, STAGE III (MODERATE) (HCC): Primary | ICD-10-CM

## 2019-06-06 DIAGNOSIS — N18.30 CHRONIC KIDNEY DISEASE, STAGE III (MODERATE) (HCC): ICD-10-CM

## 2019-06-06 LAB
ALBUMIN SERPL-MCNC: 4.7 G/DL (ref 3.5–5)
ANION GAP SERPL CALCULATED.3IONS-SCNC: 15.5 MMOL/L (ref 10–20)
BACTERIA UR QL AUTO: NORMAL /HPF
BASOPHILS # BLD AUTO: 0.03 10*3/MM3 (ref 0–0.2)
BASOPHILS NFR BLD AUTO: 0.4 % (ref 0–1.5)
BILIRUB UR QL STRIP: NEGATIVE
BUN BLD-MCNC: 19 MG/DL (ref 7–20)
BUN/CREAT SERPL: 27.1 (ref 7.1–23.5)
CALCIUM SPEC-SCNC: 9.3 MG/DL (ref 8.4–10.2)
CHLORIDE SERPL-SCNC: 98 MMOL/L (ref 98–107)
CLARITY UR: CLEAR
CO2 SERPL-SCNC: 30 MMOL/L (ref 26–30)
COD CRY URNS QL: NORMAL /HPF
COLOR UR: YELLOW
CREAT BLD-MCNC: 0.7 MG/DL (ref 0.6–1.3)
DEPRECATED RDW RBC AUTO: 39.2 FL (ref 37–54)
EOSINOPHIL # BLD AUTO: 0.13 10*3/MM3 (ref 0–0.4)
EOSINOPHIL NFR BLD AUTO: 1.8 % (ref 0.3–6.2)
ERYTHROCYTE [DISTWIDTH] IN BLOOD BY AUTOMATED COUNT: 12.1 % (ref 12.3–15.4)
FERRITIN SERPL-MCNC: 127 NG/ML (ref 11.1–264)
GFR SERPL CREATININE-BSD FRML MDRD: 85 ML/MIN/1.73
GLUCOSE BLD-MCNC: 113 MG/DL (ref 74–98)
GLUCOSE UR STRIP-MCNC: NEGATIVE MG/DL
HCT VFR BLD AUTO: 35.8 % (ref 34–46.6)
HGB BLD-MCNC: 11.6 G/DL (ref 12–15.9)
HGB UR QL STRIP.AUTO: NEGATIVE
HYALINE CASTS UR QL AUTO: NORMAL /LPF
IMM GRANULOCYTES # BLD AUTO: 0.02 10*3/MM3 (ref 0–0.05)
IMM GRANULOCYTES NFR BLD AUTO: 0.3 % (ref 0–0.5)
IRON 24H UR-MRATE: 66 MCG/DL (ref 37–181)
IRON SATN MFR SERPL: 21 % (ref 11–46)
KETONES UR QL STRIP: NEGATIVE
LEUKOCYTE ESTERASE UR QL STRIP.AUTO: NEGATIVE
LYMPHOCYTES # BLD AUTO: 2.42 10*3/MM3 (ref 0.7–3.1)
LYMPHOCYTES NFR BLD AUTO: 34 % (ref 19.6–45.3)
MCH RBC QN AUTO: 29 PG (ref 26.6–33)
MCHC RBC AUTO-ENTMCNC: 32.4 G/DL (ref 31.5–35.7)
MCV RBC AUTO: 89.5 FL (ref 79–97)
MONOCYTES # BLD AUTO: 0.46 10*3/MM3 (ref 0.1–0.9)
MONOCYTES NFR BLD AUTO: 6.5 % (ref 5–12)
NEUTROPHILS # BLD AUTO: 4.05 10*3/MM3 (ref 1.7–7)
NEUTROPHILS NFR BLD AUTO: 57 % (ref 42.7–76)
NITRITE UR QL STRIP: NEGATIVE
NRBC BLD AUTO-RTO: 0 /100 WBC (ref 0–0.2)
PH UR STRIP.AUTO: 6 [PH] (ref 5–8)
PHOSPHATE SERPL-MCNC: 3.9 MG/DL (ref 2.5–4.5)
PLATELET # BLD AUTO: 306 10*3/MM3 (ref 140–450)
PMV BLD AUTO: 9.8 FL (ref 6–12)
POTASSIUM BLD-SCNC: 4.5 MMOL/L (ref 3.5–5.1)
PROT UR QL STRIP: NEGATIVE
RBC # BLD AUTO: 4 10*6/MM3 (ref 3.77–5.28)
RBC # UR: NORMAL /HPF
REF LAB TEST METHOD: NORMAL
SODIUM BLD-SCNC: 139 MMOL/L (ref 137–145)
SP GR UR STRIP: 1.02 (ref 1–1.03)
SQUAMOUS #/AREA URNS HPF: NORMAL /HPF
TIBC SERPL-MCNC: 321 MCG/DL (ref 261–497)
UROBILINOGEN UR QL STRIP: NORMAL
WBC NRBC COR # BLD: 7.11 10*3/MM3 (ref 3.4–10.8)
WBC UR QL AUTO: NORMAL /HPF

## 2019-06-06 PROCEDURE — 81001 URINALYSIS AUTO W/SCOPE: CPT

## 2019-06-06 PROCEDURE — 36415 COLL VENOUS BLD VENIPUNCTURE: CPT

## 2019-06-06 PROCEDURE — 83540 ASSAY OF IRON: CPT

## 2019-06-06 PROCEDURE — 82728 ASSAY OF FERRITIN: CPT

## 2019-06-06 PROCEDURE — 85025 COMPLETE CBC W/AUTO DIFF WBC: CPT

## 2019-06-06 PROCEDURE — 80069 RENAL FUNCTION PANEL: CPT

## 2019-06-06 PROCEDURE — 83550 IRON BINDING TEST: CPT

## 2020-12-11 ENCOUNTER — LAB (OUTPATIENT)
Dept: LAB | Facility: HOSPITAL | Age: 60
End: 2020-12-11

## 2020-12-11 ENCOUNTER — TRANSCRIBE ORDERS (OUTPATIENT)
Dept: LAB | Facility: HOSPITAL | Age: 60
End: 2020-12-11

## 2020-12-11 DIAGNOSIS — N18.9 CHRONIC KIDNEY DISEASE, UNSPECIFIED CKD STAGE: ICD-10-CM

## 2020-12-11 DIAGNOSIS — D63.1 ANEMIA ASSOCIATED WITH STAGE 3 CHRONIC RENAL FAILURE (HCC): Primary | ICD-10-CM

## 2020-12-11 DIAGNOSIS — D63.1 ANEMIA ASSOCIATED WITH STAGE 3 CHRONIC RENAL FAILURE (HCC): ICD-10-CM

## 2020-12-11 DIAGNOSIS — N18.30 ANEMIA ASSOCIATED WITH STAGE 3 CHRONIC RENAL FAILURE (HCC): ICD-10-CM

## 2020-12-11 DIAGNOSIS — N18.30 ANEMIA ASSOCIATED WITH STAGE 3 CHRONIC RENAL FAILURE (HCC): Primary | ICD-10-CM

## 2020-12-11 LAB
ALBUMIN SERPL-MCNC: 4.5 G/DL (ref 3.5–5.2)
ANION GAP SERPL CALCULATED.3IONS-SCNC: 8.3 MMOL/L (ref 5–15)
BACTERIA UR QL AUTO: ABNORMAL /HPF
BASOPHILS # BLD AUTO: 0.05 10*3/MM3 (ref 0–0.2)
BASOPHILS NFR BLD AUTO: 0.7 % (ref 0–1.5)
BILIRUB UR QL STRIP: NEGATIVE
BUN SERPL-MCNC: 17 MG/DL (ref 8–23)
BUN/CREAT SERPL: 21.8 (ref 7–25)
CALCIUM SPEC-SCNC: 9.6 MG/DL (ref 8.6–10.5)
CHLORIDE SERPL-SCNC: 101 MMOL/L (ref 98–107)
CLARITY UR: CLEAR
CO2 SERPL-SCNC: 30.7 MMOL/L (ref 22–29)
COLOR UR: YELLOW
CREAT SERPL-MCNC: 0.78 MG/DL (ref 0.57–1)
DEPRECATED RDW RBC AUTO: 38.1 FL (ref 37–54)
EOSINOPHIL # BLD AUTO: 0.14 10*3/MM3 (ref 0–0.4)
EOSINOPHIL NFR BLD AUTO: 2.1 % (ref 0.3–6.2)
ERYTHROCYTE [DISTWIDTH] IN BLOOD BY AUTOMATED COUNT: 11.6 % (ref 12.3–15.4)
FERRITIN SERPL-MCNC: 142 NG/ML (ref 13–150)
GFR SERPL CREATININE-BSD FRML MDRD: 75 ML/MIN/1.73
GLUCOSE SERPL-MCNC: 108 MG/DL (ref 65–99)
GLUCOSE UR STRIP-MCNC: NEGATIVE MG/DL
HCT VFR BLD AUTO: 34.8 % (ref 34–46.6)
HGB BLD-MCNC: 11.4 G/DL (ref 12–15.9)
HGB UR QL STRIP.AUTO: NEGATIVE
HYALINE CASTS UR QL AUTO: ABNORMAL /LPF
IMM GRANULOCYTES # BLD AUTO: 0.03 10*3/MM3 (ref 0–0.05)
IMM GRANULOCYTES NFR BLD AUTO: 0.4 % (ref 0–0.5)
IRON 24H UR-MRATE: 55 MCG/DL (ref 37–145)
IRON SATN MFR SERPL: 14 % (ref 20–50)
KETONES UR QL STRIP: NEGATIVE
LEUKOCYTE ESTERASE UR QL STRIP.AUTO: ABNORMAL
LYMPHOCYTES # BLD AUTO: 1.77 10*3/MM3 (ref 0.7–3.1)
LYMPHOCYTES NFR BLD AUTO: 26.1 % (ref 19.6–45.3)
MCH RBC QN AUTO: 29.4 PG (ref 26.6–33)
MCHC RBC AUTO-ENTMCNC: 32.8 G/DL (ref 31.5–35.7)
MCV RBC AUTO: 89.7 FL (ref 79–97)
MONOCYTES # BLD AUTO: 0.53 10*3/MM3 (ref 0.1–0.9)
MONOCYTES NFR BLD AUTO: 7.8 % (ref 5–12)
NEUTROPHILS NFR BLD AUTO: 4.27 10*3/MM3 (ref 1.7–7)
NEUTROPHILS NFR BLD AUTO: 62.9 % (ref 42.7–76)
NITRITE UR QL STRIP: NEGATIVE
NRBC BLD AUTO-RTO: 0 /100 WBC (ref 0–0.2)
PH UR STRIP.AUTO: 6.5 [PH] (ref 5–8)
PHOSPHATE SERPL-MCNC: 3.5 MG/DL (ref 2.5–4.5)
PLATELET # BLD AUTO: 293 10*3/MM3 (ref 140–450)
PMV BLD AUTO: 10 FL (ref 6–12)
POTASSIUM SERPL-SCNC: 4.2 MMOL/L (ref 3.5–5.2)
PROT UR QL STRIP: NEGATIVE
RBC # BLD AUTO: 3.88 10*6/MM3 (ref 3.77–5.28)
RBC # UR: ABNORMAL /HPF
REF LAB TEST METHOD: ABNORMAL
SODIUM SERPL-SCNC: 140 MMOL/L (ref 136–145)
SP GR UR STRIP: 1.01 (ref 1–1.03)
SQUAMOUS #/AREA URNS HPF: ABNORMAL /HPF
TIBC SERPL-MCNC: 402 MCG/DL (ref 298–536)
TRANSFERRIN SERPL-MCNC: 270 MG/DL (ref 200–360)
UROBILINOGEN UR QL STRIP: ABNORMAL
WBC # BLD AUTO: 6.79 10*3/MM3 (ref 3.4–10.8)
WBC UR QL AUTO: ABNORMAL /HPF

## 2020-12-11 PROCEDURE — 80069 RENAL FUNCTION PANEL: CPT

## 2020-12-11 PROCEDURE — 81001 URINALYSIS AUTO W/SCOPE: CPT

## 2020-12-11 PROCEDURE — 36415 COLL VENOUS BLD VENIPUNCTURE: CPT

## 2020-12-11 PROCEDURE — 85025 COMPLETE CBC W/AUTO DIFF WBC: CPT

## 2020-12-11 PROCEDURE — 82728 ASSAY OF FERRITIN: CPT

## 2020-12-11 PROCEDURE — 84466 ASSAY OF TRANSFERRIN: CPT

## 2020-12-11 PROCEDURE — 83540 ASSAY OF IRON: CPT

## 2021-10-08 ENCOUNTER — APPOINTMENT (OUTPATIENT)
Dept: CT IMAGING | Facility: HOSPITAL | Age: 61
End: 2021-10-08

## 2021-10-08 ENCOUNTER — HOSPITAL ENCOUNTER (INPATIENT)
Facility: HOSPITAL | Age: 61
LOS: 3 days | Discharge: HOME | End: 2021-10-11
Attending: EMERGENCY MEDICINE | Admitting: FAMILY MEDICINE

## 2021-10-08 DIAGNOSIS — N12 PYELONEPHRITIS: Primary | ICD-10-CM

## 2021-10-08 DIAGNOSIS — R53.1 WEAKNESS: ICD-10-CM

## 2021-10-08 PROBLEM — N17.9 ACUTE KIDNEY INJURY (HCC): Status: ACTIVE | Noted: 2021-10-08

## 2021-10-08 LAB
ALBUMIN SERPL-MCNC: 3.7 G/DL (ref 3.5–5.2)
ALBUMIN/GLOB SERPL: 1.1 G/DL
ALP SERPL-CCNC: 76 U/L (ref 39–117)
ALT SERPL W P-5'-P-CCNC: 25 U/L (ref 1–33)
ANION GAP SERPL CALCULATED.3IONS-SCNC: 15.5 MMOL/L (ref 5–15)
AST SERPL-CCNC: 24 U/L (ref 1–32)
BACTERIA UR QL AUTO: ABNORMAL /HPF
BASOPHILS # BLD AUTO: 0.04 10*3/MM3 (ref 0–0.2)
BASOPHILS NFR BLD AUTO: 0.4 % (ref 0–1.5)
BILIRUB SERPL-MCNC: 0.4 MG/DL (ref 0–1.2)
BILIRUB UR QL STRIP: NEGATIVE
BUN SERPL-MCNC: 86 MG/DL (ref 8–23)
BUN/CREAT SERPL: 14.8 (ref 7–25)
CALCIUM SPEC-SCNC: 8.9 MG/DL (ref 8.6–10.5)
CHLORIDE SERPL-SCNC: 93 MMOL/L (ref 98–107)
CLARITY UR: ABNORMAL
CO2 SERPL-SCNC: 24.5 MMOL/L (ref 22–29)
COLOR UR: YELLOW
CREAT SERPL-MCNC: 5.81 MG/DL (ref 0.57–1)
DEPRECATED RDW RBC AUTO: 40.9 FL (ref 37–54)
EOSINOPHIL # BLD AUTO: 0.31 10*3/MM3 (ref 0–0.4)
EOSINOPHIL NFR BLD AUTO: 3.3 % (ref 0.3–6.2)
ERYTHROCYTE [DISTWIDTH] IN BLOOD BY AUTOMATED COUNT: 13.3 % (ref 12.3–15.4)
FLUAV RNA RESP QL NAA+PROBE: NOT DETECTED
FLUBV RNA RESP QL NAA+PROBE: NOT DETECTED
GFR SERPL CREATININE-BSD FRML MDRD: 7 ML/MIN/1.73
GFR SERPL CREATININE-BSD FRML MDRD: ABNORMAL ML/MIN/{1.73_M2}
GLOBULIN UR ELPH-MCNC: 3.4 GM/DL
GLUCOSE BLDC GLUCOMTR-MCNC: 110 MG/DL (ref 70–130)
GLUCOSE SERPL-MCNC: 111 MG/DL (ref 65–99)
GLUCOSE UR STRIP-MCNC: NEGATIVE MG/DL
HCT VFR BLD AUTO: 34.3 % (ref 34–46.6)
HGB BLD-MCNC: 11.5 G/DL (ref 12–15.9)
HGB UR QL STRIP.AUTO: ABNORMAL
HYALINE CASTS UR QL AUTO: ABNORMAL /LPF
IMM GRANULOCYTES # BLD AUTO: 0.05 10*3/MM3 (ref 0–0.05)
IMM GRANULOCYTES NFR BLD AUTO: 0.5 % (ref 0–0.5)
KETONES UR QL STRIP: NEGATIVE
LEUKOCYTE ESTERASE UR QL STRIP.AUTO: ABNORMAL
LYMPHOCYTES # BLD AUTO: 0.73 10*3/MM3 (ref 0.7–3.1)
LYMPHOCYTES NFR BLD AUTO: 7.8 % (ref 19.6–45.3)
MCH RBC QN AUTO: 28.3 PG (ref 26.6–33)
MCHC RBC AUTO-ENTMCNC: 33.5 G/DL (ref 31.5–35.7)
MCV RBC AUTO: 84.3 FL (ref 79–97)
MONOCYTES # BLD AUTO: 0.66 10*3/MM3 (ref 0.1–0.9)
MONOCYTES NFR BLD AUTO: 7.1 % (ref 5–12)
MUCOUS THREADS URNS QL MICRO: ABNORMAL /HPF
NEUTROPHILS NFR BLD AUTO: 7.54 10*3/MM3 (ref 1.7–7)
NEUTROPHILS NFR BLD AUTO: 80.9 % (ref 42.7–76)
NITRITE UR QL STRIP: NEGATIVE
NRBC BLD AUTO-RTO: 0 /100 WBC (ref 0–0.2)
PH UR STRIP.AUTO: <=5 [PH] (ref 5–8)
PLATELET # BLD AUTO: 152 10*3/MM3 (ref 140–450)
PMV BLD AUTO: 9.8 FL (ref 6–12)
POTASSIUM SERPL-SCNC: 4.1 MMOL/L (ref 3.5–5.2)
PROT SERPL-MCNC: 7.1 G/DL (ref 6–8.5)
PROT UR QL STRIP: ABNORMAL
RBC # BLD AUTO: 4.07 10*6/MM3 (ref 3.77–5.28)
RBC # UR: ABNORMAL /HPF
REF LAB TEST METHOD: ABNORMAL
SARS-COV-2 RNA RESP QL NAA+PROBE: NOT DETECTED
SODIUM SERPL-SCNC: 133 MMOL/L (ref 136–145)
SP GR UR STRIP: 1.01 (ref 1–1.03)
SQUAMOUS #/AREA URNS HPF: ABNORMAL /HPF
UROBILINOGEN UR QL STRIP: ABNORMAL
WBC # BLD AUTO: 9.33 10*3/MM3 (ref 3.4–10.8)
WBC UR QL AUTO: ABNORMAL /HPF

## 2021-10-08 PROCEDURE — 25010000002 CEFTRIAXONE SODIUM-DEXTROSE 1-3.74 GM-%(50ML) RECONSTITUTED SOLUTION: Performed by: NURSE PRACTITIONER

## 2021-10-08 PROCEDURE — 99221 1ST HOSP IP/OBS SF/LOW 40: CPT | Performed by: FAMILY MEDICINE

## 2021-10-08 PROCEDURE — 74176 CT ABD & PELVIS W/O CONTRAST: CPT

## 2021-10-08 PROCEDURE — 94799 UNLISTED PULMONARY SVC/PX: CPT

## 2021-10-08 PROCEDURE — 87086 URINE CULTURE/COLONY COUNT: CPT | Performed by: NURSE PRACTITIONER

## 2021-10-08 PROCEDURE — 85025 COMPLETE CBC W/AUTO DIFF WBC: CPT | Performed by: NURSE PRACTITIONER

## 2021-10-08 PROCEDURE — 82962 GLUCOSE BLOOD TEST: CPT

## 2021-10-08 PROCEDURE — 80053 COMPREHEN METABOLIC PANEL: CPT | Performed by: NURSE PRACTITIONER

## 2021-10-08 PROCEDURE — 81001 URINALYSIS AUTO W/SCOPE: CPT | Performed by: NURSE PRACTITIONER

## 2021-10-08 PROCEDURE — 94660 CPAP INITIATION&MGMT: CPT

## 2021-10-08 PROCEDURE — 87636 SARSCOV2 & INF A&B AMP PRB: CPT | Performed by: NURSE PRACTITIONER

## 2021-10-08 PROCEDURE — 99284 EMERGENCY DEPT VISIT MOD MDM: CPT

## 2021-10-08 RX ORDER — TAMSULOSIN HYDROCHLORIDE 0.4 MG/1
1 CAPSULE ORAL DAILY
COMMUNITY
End: 2022-03-10

## 2021-10-08 RX ORDER — ACETAMINOPHEN 325 MG/1
650 TABLET ORAL EVERY 6 HOURS PRN
Status: DISCONTINUED | OUTPATIENT
Start: 2021-10-08 | End: 2021-10-11 | Stop reason: HOSPADM

## 2021-10-08 RX ORDER — PRAVASTATIN SODIUM 40 MG
80 TABLET ORAL DAILY
COMMUNITY

## 2021-10-08 RX ORDER — CLOBETASOL PROPIONATE 0.5 MG/G
CREAM TOPICAL EVERY 12 HOURS SCHEDULED
Status: DISCONTINUED | OUTPATIENT
Start: 2021-10-08 | End: 2021-10-11 | Stop reason: HOSPADM

## 2021-10-08 RX ORDER — BUSPIRONE HYDROCHLORIDE 15 MG/1
15 TABLET ORAL DAILY
Status: DISCONTINUED | OUTPATIENT
Start: 2021-10-08 | End: 2021-10-11 | Stop reason: HOSPADM

## 2021-10-08 RX ORDER — ONDANSETRON 2 MG/ML
4 INJECTION INTRAMUSCULAR; INTRAVENOUS EVERY 6 HOURS PRN
Status: DISCONTINUED | OUTPATIENT
Start: 2021-10-08 | End: 2021-10-11 | Stop reason: HOSPADM

## 2021-10-08 RX ORDER — SODIUM CHLORIDE 9 MG/ML
100 INJECTION, SOLUTION INTRAVENOUS CONTINUOUS
Status: DISCONTINUED | OUTPATIENT
Start: 2021-10-08 | End: 2021-10-11 | Stop reason: HOSPADM

## 2021-10-08 RX ORDER — THIAMINE HCL 50 MG
50 TABLET ORAL DAILY
COMMUNITY
End: 2022-03-10

## 2021-10-08 RX ORDER — PANTOPRAZOLE SODIUM 40 MG/1
40 TABLET, DELAYED RELEASE ORAL EVERY MORNING
Status: DISCONTINUED | OUTPATIENT
Start: 2021-10-09 | End: 2021-10-11 | Stop reason: HOSPADM

## 2021-10-08 RX ORDER — VENLAFAXINE HYDROCHLORIDE 150 MG/1
150 CAPSULE, EXTENDED RELEASE ORAL DAILY
Status: DISCONTINUED | OUTPATIENT
Start: 2021-10-08 | End: 2021-10-11 | Stop reason: HOSPADM

## 2021-10-08 RX ORDER — BUSPIRONE HYDROCHLORIDE 15 MG/1
15 TABLET ORAL DAILY
COMMUNITY

## 2021-10-08 RX ORDER — MONTELUKAST SODIUM 10 MG/1
10 TABLET ORAL NIGHTLY
COMMUNITY

## 2021-10-08 RX ORDER — VENLAFAXINE HYDROCHLORIDE 150 MG/1
150 CAPSULE, EXTENDED RELEASE ORAL DAILY
COMMUNITY

## 2021-10-08 RX ORDER — ONDANSETRON 4 MG/1
4 TABLET, ORALLY DISINTEGRATING ORAL EVERY 8 HOURS PRN
COMMUNITY
End: 2022-03-10

## 2021-10-08 RX ORDER — FAMOTIDINE 40 MG/1
40 TABLET, FILM COATED ORAL DAILY
COMMUNITY

## 2021-10-08 RX ORDER — LEVOTHYROXINE SODIUM 0.07 MG/1
75 TABLET ORAL DAILY
Status: DISCONTINUED | OUTPATIENT
Start: 2021-10-08 | End: 2021-10-11 | Stop reason: HOSPADM

## 2021-10-08 RX ORDER — ATORVASTATIN CALCIUM 40 MG/1
40 TABLET, FILM COATED ORAL DAILY
Status: CANCELLED | OUTPATIENT
Start: 2021-10-08

## 2021-10-08 RX ORDER — MONTELUKAST SODIUM 10 MG/1
10 TABLET ORAL NIGHTLY
Status: DISCONTINUED | OUTPATIENT
Start: 2021-10-08 | End: 2021-10-11 | Stop reason: HOSPADM

## 2021-10-08 RX ORDER — PREGABALIN 100 MG/1
100 CAPSULE ORAL 2 TIMES DAILY
COMMUNITY

## 2021-10-08 RX ORDER — GABAPENTIN 300 MG/1
300 CAPSULE ORAL 3 TIMES DAILY
Status: DISCONTINUED | OUTPATIENT
Start: 2021-10-08 | End: 2021-10-11 | Stop reason: HOSPADM

## 2021-10-08 RX ORDER — CEFTRIAXONE 1 G/50ML
1 INJECTION, SOLUTION INTRAVENOUS ONCE
Status: COMPLETED | OUTPATIENT
Start: 2021-10-08 | End: 2021-10-08

## 2021-10-08 RX ORDER — SODIUM CHLORIDE 0.9 % (FLUSH) 0.9 %
10 SYRINGE (ML) INJECTION AS NEEDED
Status: DISCONTINUED | OUTPATIENT
Start: 2021-10-08 | End: 2021-10-11 | Stop reason: HOSPADM

## 2021-10-08 RX ORDER — FENOFIBRATE 48 MG/1
48 TABLET, COATED ORAL DAILY
Status: DISCONTINUED | OUTPATIENT
Start: 2021-10-08 | End: 2021-10-11 | Stop reason: HOSPADM

## 2021-10-08 RX ORDER — CETIRIZINE HYDROCHLORIDE 10 MG/1
10 TABLET ORAL 2 TIMES DAILY
Status: DISCONTINUED | OUTPATIENT
Start: 2021-10-08 | End: 2021-10-11 | Stop reason: HOSPADM

## 2021-10-08 RX ORDER — DEXTROSE MONOHYDRATE 25 G/50ML
25 INJECTION, SOLUTION INTRAVENOUS
Status: DISCONTINUED | OUTPATIENT
Start: 2021-10-08 | End: 2021-10-11 | Stop reason: HOSPADM

## 2021-10-08 RX ORDER — FLUTICASONE PROPIONATE 50 MCG
2 SPRAY, SUSPENSION (ML) NASAL DAILY
Status: DISCONTINUED | OUTPATIENT
Start: 2021-10-08 | End: 2021-10-11 | Stop reason: HOSPADM

## 2021-10-08 RX ORDER — FAMOTIDINE 20 MG/1
40 TABLET, FILM COATED ORAL DAILY
Status: DISCONTINUED | OUTPATIENT
Start: 2021-10-08 | End: 2021-10-11 | Stop reason: HOSPADM

## 2021-10-08 RX ORDER — NICOTINE POLACRILEX 4 MG
1 LOZENGE BUCCAL
Status: DISCONTINUED | OUTPATIENT
Start: 2021-10-08 | End: 2021-10-11 | Stop reason: HOSPADM

## 2021-10-08 RX ORDER — CEFDINIR 300 MG/1
300 CAPSULE ORAL 2 TIMES DAILY
COMMUNITY
End: 2021-10-11 | Stop reason: HOSPADM

## 2021-10-08 RX ORDER — KETOROLAC TROMETHAMINE 10 MG/1
10 TABLET, FILM COATED ORAL EVERY 6 HOURS PRN
COMMUNITY
End: 2021-10-11 | Stop reason: HOSPADM

## 2021-10-08 RX ORDER — CEFTRIAXONE 1 G/50ML
1 INJECTION, SOLUTION INTRAVENOUS EVERY 24 HOURS
Status: DISCONTINUED | OUTPATIENT
Start: 2021-10-09 | End: 2021-10-09

## 2021-10-08 RX ORDER — FLUOXETINE HYDROCHLORIDE 20 MG/1
60 CAPSULE ORAL DAILY
Status: DISCONTINUED | OUTPATIENT
Start: 2021-10-08 | End: 2021-10-11 | Stop reason: HOSPADM

## 2021-10-08 RX ORDER — PRAVASTATIN SODIUM 20 MG
80 TABLET ORAL DAILY
Status: DISCONTINUED | OUTPATIENT
Start: 2021-10-08 | End: 2021-10-11 | Stop reason: HOSPADM

## 2021-10-08 RX ORDER — PREGABALIN 50 MG/1
100 CAPSULE ORAL 2 TIMES DAILY
Status: DISCONTINUED | OUTPATIENT
Start: 2021-10-08 | End: 2021-10-11 | Stop reason: HOSPADM

## 2021-10-08 RX ORDER — KETOROLAC TROMETHAMINE 30 MG/ML
30 INJECTION, SOLUTION INTRAMUSCULAR; INTRAVENOUS ONCE AS NEEDED
Status: DISCONTINUED | OUTPATIENT
Start: 2021-10-08 | End: 2021-10-08

## 2021-10-08 RX ORDER — OXYCODONE HYDROCHLORIDE 10 MG/1
10 TABLET ORAL 3 TIMES DAILY
COMMUNITY

## 2021-10-08 RX ORDER — OXYCODONE HYDROCHLORIDE 5 MG/1
10 TABLET ORAL 3 TIMES DAILY
Status: DISCONTINUED | OUTPATIENT
Start: 2021-10-08 | End: 2021-10-11 | Stop reason: HOSPADM

## 2021-10-08 RX ORDER — LISINOPRIL 10 MG/1
10 TABLET ORAL DAILY
Status: DISCONTINUED | OUTPATIENT
Start: 2021-10-08 | End: 2021-10-11 | Stop reason: HOSPADM

## 2021-10-08 RX ADMIN — SODIUM CHLORIDE 100 ML/HR: 9 INJECTION, SOLUTION INTRAVENOUS at 21:35

## 2021-10-08 RX ADMIN — PRAVASTATIN SODIUM 80 MG: 20 TABLET ORAL at 21:36

## 2021-10-08 RX ADMIN — CEFTRIAXONE 1 G: 1 INJECTION, SOLUTION INTRAVENOUS at 18:01

## 2021-10-08 RX ADMIN — OXYCODONE HYDROCHLORIDE 10 MG: 5 TABLET ORAL at 21:36

## 2021-10-08 RX ADMIN — THIAMINE HCL TAB 100 MG 50 MG: 100 TAB at 21:36

## 2021-10-08 RX ADMIN — SODIUM CHLORIDE, PRESERVATIVE FREE 10 ML: 5 INJECTION INTRAVENOUS at 21:35

## 2021-10-08 RX ADMIN — SODIUM CHLORIDE 1000 ML: 9 INJECTION, SOLUTION INTRAVENOUS at 15:45

## 2021-10-08 RX ADMIN — FENOFIBRATE 48 MG: 48 TABLET ORAL at 21:37

## 2021-10-08 RX ADMIN — FLUTICASONE PROPIONATE 2 SPRAY: 50 SPRAY, METERED NASAL at 21:37

## 2021-10-08 RX ADMIN — Medication 5000 UNITS: at 21:36

## 2021-10-08 RX ADMIN — FAMOTIDINE 40 MG: 20 TABLET ORAL at 21:37

## 2021-10-08 RX ADMIN — LISINOPRIL 10 MG: 10 TABLET ORAL at 21:37

## 2021-10-08 RX ADMIN — CETIRIZINE HYDROCHLORIDE 10 MG: 10 TABLET, FILM COATED ORAL at 21:36

## 2021-10-08 RX ADMIN — BUSPIRONE HYDROCHLORIDE 15 MG: 15 TABLET ORAL at 21:37

## 2021-10-08 RX ADMIN — MONTELUKAST 10 MG: 10 TABLET, FILM COATED ORAL at 21:36

## 2021-10-08 RX ADMIN — PREGABALIN 100 MG: 50 CAPSULE ORAL at 21:36

## 2021-10-08 NOTE — H&P
[facility]   HISTORY AND PHYSICAL      Name:  Estrella Recio   Age:  61 y.o.  Sex:  female  :  1960  MRN:  [id number]   Visit Number:  [id number]  Admission Date:  10/8/2021  Date Of Service:  10/08/21  Primary Care Physician:  Stephanie Kulkarni MD    Chief Complaint:     Generalized weakness, right flank pain    History Of Presenting Illness:      Mrs. Recio is a 61 years old female, with history of colitis, diabetes, hyperlipidemia, hypertension, CKD 3, psoriatic arthritis and sleep apnea, who presented to the ER for right flank pain and generalized weakness. Patient reported that she had started hurting on her right flank area on  and has been consistent since then. Patient went to the ER in [place] and was diagnosed with a kidney stone and was told that her kidney stone has moved down.  Patient reports that since  she has been feeling more fatigued and generally weak all over.  She reports that she has not been able to perform her daily activities due to her fatigue. Patient reports decreased appetite and nausea however denies any vomiting.  Patient reports that she has not been able to urinate good amount lately, however patient denies dysuria or urgency.  Patient denies any hematuria.  Patient denies any fever, chills, cough, shortness of breath or chest pain.     ER work-up done and was significant for creatinine of 5.81, BUN of 86, sodium 133, WBC normal.  UA showed cloudy urine with RBC 3-5, WBC 21-30, bacteria +2, nitrates negative with trace leukocytes.  Covid test negative.  CT abdomen pelvis W/O: Mild right hydronephrosis without obstructing stone identified. Differential considerations include urinary tract infection or recently passed stone.    Review Of Systems:    All systems were reviewed and negative except as mentioned in history of presenting illness, assessment and plan.    Past Medical History: Patient  has a past medical history of Sandra  esophagus, Colitis, DDD (degenerative disc disease), cervical, Diabetes mellitus (HCC), Hyperlipidemia, Hypertension, Kidney disease, chronic, stage III (moderate, EGFR 30-59 ml/min) (HCC), Psoriatic arthritis (HCC), and Sleep apnea.    Past Surgical History: Patient  has a past surgical history that includes Cervical discectomy; Hysterectomy; Tonsillectomy; and Sinus surgery.    Social History: Patient  reports that she has never smoked. She does not have any smokeless tobacco history on file. She reports that she does not drink alcohol and does not use drugs.    Family History: Patient's family history is not on file.    Allergies:      Patient has no known allergies.    Home Medications:    Prior to Admission Medications     Prescriptions Last Dose Informant Patient Reported? Taking?    busPIRone (BUSPAR) 15 MG tablet   Yes Yes    Take 15 mg by mouth Daily.    cefdinir (OMNICEF) 300 MG capsule   Yes Yes    Take 300 mg by mouth 2 (Two) Times a Day.    famotidine (PEPCID) 40 MG tablet   Yes Yes    Take 40 mg by mouth Daily.    ketorolac (TORADOL) 10 MG tablet   Yes Yes    Take 10 mg by mouth Every 6 (Six) Hours As Needed for Moderate Pain .    montelukast (SINGULAIR) 10 MG tablet   Yes Yes    Take 10 mg by mouth Every Night.    ondansetron ODT (ZOFRAN-ODT) 4 MG disintegrating tablet   Yes Yes    Place 4 mg on the tongue Every 8 (Eight) Hours As Needed for Nausea or Vomiting.    oxyCODONE (ROXICODONE) 10 MG tablet   Yes Yes    Take 10 mg by mouth 3 (Three) Times a Day.    pravastatin (PRAVACHOL) 40 MG tablet   Yes Yes    Take 80 mg by mouth Daily.    pregabalin (LYRICA) 100 MG capsule   Yes Yes    Take 100 mg by mouth 2 (Two) Times a Day.    SITagliptin (JANUVIA) 100 MG tablet   Yes Yes    Take 100 mg by mouth Daily.    tamsulosin (FLOMAX) 0.4 MG capsule 24 hr capsule   Yes Yes    Take 1 capsule by mouth Daily.    Thiamine HCl (vitamin B-1) 50 MG tablet   Yes Yes    Take 50 mg by mouth Daily.    venlafaxine XR  (EFFEXOR-XR) 150 MG 24 hr capsule   Yes Yes    Take 150 mg by mouth Daily.    vitamin D3 125 MCG (5000 UT) capsule capsule   Yes Yes    Take 5,000 Units by mouth Daily.    atorvastatin (LIPITOR) 40 MG tablet   Yes No    Take 40 mg by mouth Daily.    cetirizine (zyrTEC) 10 MG tablet   Yes No    Take 10 mg by mouth 2 (Two) Times a Day.    clobetasol (TEMOVATE) 0.05 % ointment   Yes No    Apply  topically As Needed.    estradiol (MINIVELLE, VIVELLE-DOT) 0.075 MG/24HR patch   Yes No    Place 1 patch on the skin 2 (Two) Times a Week.    fenofibrate (TRICOR) 54 MG tablet   Yes No    Take 54 mg by mouth Daily.    FLUoxetine (PROzac) 20 MG capsule   Yes No    Take 60 mg by mouth Daily.    fluticasone (FLONASE) 50 MCG/ACT nasal spray   Yes No    2 sprays into each nostril Daily.    gabapentin (NEURONTIN) 300 MG capsule   Yes No    Take 300 mg by mouth 3 (Three) Times a Day.    levothyroxine (SYNTHROID, LEVOTHROID) 75 MCG tablet   Yes No    Take 75 mcg by mouth Daily.    lisinopril (PRINIVIL,ZESTRIL) 10 MG tablet   Yes No    Take 10 mg by mouth Daily.    loperamide (IMODIUM) 2 MG capsule   Yes No    Take 2 mg by mouth 4 (Four) Times a Day As Needed for Diarrhea.    metFORMIN (GLUCOPHAGE) 500 MG tablet   Yes No    Take 500 mg by mouth 2 (Two) Times a Day With Meals.    predniSONE (DELTASONE) 20 MG tablet   No No    2 po daily for 5 days, 1 po daily for 5 days    RABEprazole (ACIPHEX) 20 MG EC tablet   Yes No    Take 20 mg by mouth 2 (Two) Times a Day.    valACYclovir (VALTREX) 1000 MG tablet   Yes No    Take 1,000 mg by mouth 2 (Two) Times a Day As Needed.        ED Medications:    Medications   sodium chloride 0.9 % flush 10 mL (has no administration in time range)   sodium chloride 0.9 % bolus 1,000 mL (0 mL Intravenous Stopped 10/8/21 1804)   cefTRIAXone (ROCEPHIN) IVPB 1 g/50ml dextrose (premix) (1 g Intravenous New Bag 10/8/21 1801)     Vital Signs:  Temp:  [98.4 °F (36.9 °C)] 98.4 °F (36.9 °C)  Heart Rate:  [74-92]  75  Resp:  [20] 20  BP: (107-121)/(57-75) 121/69        10/08/21  1357   Weight: 89.8 kg (198 lb)     Body mass index is 32.95 kg/m².    Physical Exam:     General Appearance:  Alert and cooperative.  No acute distress.   Head:  Atraumatic and normocephalic.   Eyes: Conjunctivae and sclerae normal, no icterus. No pallor.   Ears:  Ears with no abnormalities noted.   Throat: No oral lesions, no thrush, oral mucosa moist.   Neck: Supple, trachea midline, no thyromegaly.   Back:   No kyphoscoliosis present. No tenderness to palpation.   Lungs:   Breath sounds heard bilaterally equally.  No crackles or wheezing. No Pleural rub or bronchial breathing.   Heart:  Normal S1 and S2, no murmur, no gallop, no rub. No JVD.   Abdomen:   Normal bowel sounds, no masses, no organomegaly. Soft, nontender, nondistended, no rebound tenderness.   Extremities: Supple, no edema, no cyanosis, no clubbing.   Pulses: Pulses palpable bilaterally.   Skin: No bleeding or rash.   Neurologic: Alert and oriented x 3. No facial asymmetry. Moves all four limbs. No tremors.      Laboratory data:    I have reviewed the labs done in the emergency room.    Results from last 7 days   Lab Units 10/08/21  1540   SODIUM mmol/L 133*   POTASSIUM mmol/L 4.1   CHLORIDE mmol/L 93*   CO2 mmol/L 24.5   BUN mg/dL 86*   CREATININE mg/dL 5.81*   CALCIUM mg/dL 8.9   BILIRUBIN mg/dL 0.4   ALK PHOS U/L 76   ALT (SGPT) U/L 25   AST (SGOT) U/L 24   GLUCOSE mg/dL 111*     Results from last 7 days   Lab Units 10/08/21  1540   WBC 10*3/mm3 9.33   HEMOGLOBIN g/dL 11.5*   HEMATOCRIT % 34.3   PLATELETS 10*3/mm3 152                             Results from last 7 days   Lab Units 10/08/21  1541   COLOR UA  Yellow   GLUCOSE UA  Negative   KETONES UA  Negative   LEUKOCYTES UA  Trace*   PH, URINE  <=5.0   BILIRUBIN UA  Negative   UROBILINOGEN UA  0.2 E.U./dL   RBC UA /HPF 3-5*   WBC UA /HPF 21-30*       Pain Management Panel     Pain Management Panel Latest Ref Rng & Units  5/24/2018 3/13/2018    CREATININE UR mg/dL 230.6 76.2          Radiology:    CT Abdomen Pelvis Without Contrast    Result Date: 10/8/2021  FINAL REPORT CLINICAL HISTORY: Flank pain, kidney stone suspected FINDINGS: Technique: Axial images through the abdomen and pelvis were performed by computed tomography. This study was performed with techniques to keep radiation doses as low as reasonably achievable (ALARA). Individualized dose reduction techniques using automated exposure control or adjustment of mA and/or kV according to the patient's size were employed.  Abdomen: There is moderate bilateral lower lobe atelectasis.  The liver is normal in size and attenuation.  The gallbladder is present. The spleen is unremarkable.  The pancreas is normal.  The adrenals are normal.  The aorta is normal in caliber.  There is mild right hydronephrosis without obstructing stone, could be due to recently passed stone or infection.  There is no nephrolithiasis.  No bowel obstruction is identified.  Pelvis: The appendix is normal.  No distal ureteral stone is identified. The patient is status post hysterectomy.  The urinary bladder is unremarkable. There is no free fluid or adenopathy.     Mild right hydronephrosis without obstructing stone identified. Differential considerations include urinary tract infection or recently passed stone. Authenticated by SIMONA Lee MD on 10/08/2021 05:20:52 PM      Assessment:    1.  Acute kidney injury, POA  2.  Urinary tract infection, POA  3.  Kidney stone, improved  4.  Diabetes  5.  Hypertension    Plan:    Patient will be admitted to the floor. Patient is hemodynamically stable. We will start patient on IV fluids and continue Rocephin. Urine culture is ordered and pending.  Avoid nephrotoxic medicines.  We will follow up with a.m. labs.  Sliding scale insulin for diabetes.  Zofran and Tylenol as needed.  We will continue home meds otherwise as tolerated.    Risk Assessment: High  DVT  Prophylaxis: SCDs  Code Status: Full  Diet: Diabetic    Advance Care Planning   ACP discussion was held with the patient during this visit. Patient does not have an advance directive, declines further assistance.      Satinder Baker MD  10/08/21  18:57 EDT    Dictated utilizing Dragon dictation.

## 2021-10-08 NOTE — ED NOTES
House Supervisor contacted for ed assignment. Awaiting call back.     [facility]  10/08/21 3094

## 2021-10-08 NOTE — ED PROVIDER NOTES
Subjective   Chief Complaint: Flank pain   history of Present Illness: Patient recently diagnosed with right-sided kidney stones at outside hospital.  Patient states her pain improved however over the last 2 days pain has returned and has increased over the interval.  Onset: 1 week  Duration: Continuous  Exacerbating / Alleviating factors: Exacerbated by urination.  Alleviated by nothing  Associated symptoms: Nausea, dysuria    Nurses Notes reviewed and agree, including vitals, allergies, social history and prior medical history.                Review of Systems   Genitourinary: Positive for decreased urine volume and flank pain.   All other systems reviewed and are negative.      Past Medical History:   Diagnosis Date   • Sandra esophagus    • Colitis    • DDD (degenerative disc disease), cervical    • Diabetes mellitus (HCC)    • Hyperlipidemia    • Hypertension    • Kidney disease, chronic, stage III (moderate, EGFR 30-59 ml/min) (HCC)    • Psoriatic arthritis (HCC)    • Sleep apnea        No Known Allergies    Past Surgical History:   Procedure Laterality Date   • CERVICAL DISCECTOMY ANTERIOR     • HYSTERECTOMY     • SINUS SURGERY     • TONSILLECTOMY         Family History   Problem Relation Age of Onset   • Kidney disease Neg Hx        Social History     Socioeconomic History   • Marital status:    Tobacco Use   • Smoking status: Never Smoker   Substance and Sexual Activity   • Alcohol use: Never   • Drug use: Never           Objective   Physical Exam  Vitals and nursing note reviewed.   Constitutional:       Appearance: Normal appearance.   HENT:      Head: Normocephalic and atraumatic.   Eyes:      Extraocular Movements: Extraocular movements intact.   Cardiovascular:      Rate and Rhythm: Normal rate and regular rhythm.      Pulses: Normal pulses.      Heart sounds: Normal heart sounds.   Pulmonary:      Effort: Pulmonary effort is normal.      Breath sounds: Normal breath sounds.   Abdominal:       General: Abdomen is flat. Bowel sounds are normal.      Palpations: Abdomen is soft.      Tenderness: There is right CVA tenderness.   Musculoskeletal:         General: Normal range of motion.      Cervical back: Normal range of motion and neck supple.   Skin:     General: Skin is warm.      Capillary Refill: Capillary refill takes less than 2 seconds.   Neurological:      Mental Status: She is alert.      Comments: Himanshu Coma Scale 15, cranial nerves II through XII grossly intact, moves all extremities, alert and oriented x3   Psychiatric:         Mood and Affect: Mood normal.         Behavior: Behavior normal.         Thought Content: Thought content normal.         Judgment: Judgment normal.         Procedures           ED Course  ED Course as of 10/29/21 2018   Fri Oct 08, 2021   1559 Creatinine(!): 5.81 [KH]   1619 Comprehensive Metabolic Panel(!) [KH]   1737 Discussed findings with patient.  She is agreeable to be admitted for IV antibiotics for pyelonephritis. [KH]      ED Course User Index  [KH] Abdi Newton APRN                                           MDM    Final diagnoses:   Pyelonephritis   Weakness       ED Disposition  ED Disposition     ED Disposition Condition Comment    Decision to Admit  Level of Care: Med/Surg [1]   Diagnosis: Pyelonephritis [920530]   Admitting Physician: AGATHA SANTANA [[id number]]   Attending Physician: AGATHA SANTANA [[id number]]   Isolate for COVID?: No [0]   Certification: I Certify That Inpatient Hospital Services Are Medically Necessary For Greater Than 2 Midnights            Stephanie Kulkarni MD  [address] [facility] [address]  [phone]    Follow up on 10/13/2021  @ 8:40 am         Medication List      Changed    lisinopril 10 MG tablet  Commonly known as: PRINIVILZESTRIL  Take 1 tablet by mouth Daily. Resume on Friday  What changed: additional instructions     SITagliptin 25 MG tablet  Commonly known as: JANUVIA  Take 1 tablet by mouth Daily for 30  days. Starts on Friday  What changed:   · medication strength  · how much to take  · additional instructions        Stop    atorvastatin 40 MG tablet  Commonly known as: LIPITOR     cefdinir 300 MG capsule  Commonly known as: OMNICEF     gabapentin 300 MG capsule  Commonly known as: NEURONTIN     ketorolac 10 MG tablet  Commonly known as: TORADOL     metFORMIN 500 MG tablet  Commonly known as: GLUCOPHAGE     predniSONE 20 MG tablet  Commonly known as: DELTASONE        ASK your doctor about these medications    levoFLOXacin 500 MG tablet  Commonly known as: Levaquin  Take 1 tablet by mouth Every Other Day for 4 days.  Ask about: Should I take this medication?           Where to Get Your Medications      These medications were sent to [facility] [address] [place] [address] - [address] [place] [address] - [phone]  - [phone]   [address] [facility] [address]    Phone: [phone]   · levoFLOXacin 500 MG tablet  · SITagliptin 25 MG tablet     Information about where to get these medications is not yet available    Ask your nurse or doctor about these medications  · lisinopril 10 MG tablet          Abdi Newton, YASMANY  10/08/21 1742       Abdi Newton, YASMANY  10/08/21 1746       Abdi Newton, YASMANY  10/29/21 2018

## 2021-10-09 LAB
ANION GAP SERPL CALCULATED.3IONS-SCNC: 15.3 MMOL/L (ref 5–15)
BACTERIA SPEC AEROBE CULT: NO GROWTH
BUN SERPL-MCNC: 81 MG/DL (ref 8–23)
BUN/CREAT SERPL: 16.1 (ref 7–25)
CALCIUM SPEC-SCNC: 8.4 MG/DL (ref 8.6–10.5)
CHLORIDE SERPL-SCNC: 101 MMOL/L (ref 98–107)
CLUMPED PLATELETS: PRESENT
CO2 SERPL-SCNC: 18.7 MMOL/L (ref 22–29)
CREAT SERPL-MCNC: 5.03 MG/DL (ref 0.57–1)
DEPRECATED RDW RBC AUTO: 40.3 FL (ref 37–54)
EOSINOPHIL # BLD MANUAL: 0.24 10*3/MM3 (ref 0–0.4)
EOSINOPHIL NFR BLD MANUAL: 3 % (ref 0.3–6.2)
ERYTHROCYTE [DISTWIDTH] IN BLOOD BY AUTOMATED COUNT: 13.3 % (ref 12.3–15.4)
GFR SERPL CREATININE-BSD FRML MDRD: 9 ML/MIN/1.73
GFR SERPL CREATININE-BSD FRML MDRD: ABNORMAL ML/MIN/{1.73_M2}
GLUCOSE BLDC GLUCOMTR-MCNC: 100 MG/DL (ref 70–130)
GLUCOSE BLDC GLUCOMTR-MCNC: 90 MG/DL (ref 70–130)
GLUCOSE BLDC GLUCOMTR-MCNC: 96 MG/DL (ref 70–130)
GLUCOSE BLDC GLUCOMTR-MCNC: 99 MG/DL (ref 70–130)
GLUCOSE SERPL-MCNC: 119 MG/DL (ref 65–99)
HCT VFR BLD AUTO: 31.6 % (ref 34–46.6)
HGB BLD-MCNC: 10.9 G/DL (ref 12–15.9)
LYMPHOCYTES # BLD MANUAL: 1.03 10*3/MM3 (ref 0.7–3.1)
LYMPHOCYTES NFR BLD MANUAL: 12 % (ref 19.6–45.3)
LYMPHOCYTES NFR BLD MANUAL: 5 % (ref 5–12)
MCH RBC QN AUTO: 28.4 PG (ref 26.6–33)
MCHC RBC AUTO-ENTMCNC: 34.5 G/DL (ref 31.5–35.7)
MCV RBC AUTO: 82.3 FL (ref 79–97)
MONOCYTES # BLD AUTO: 0.4 10*3/MM3 (ref 0.1–0.9)
NEUTROPHILS # BLD AUTO: 6.26 10*3/MM3 (ref 1.7–7)
NEUTROPHILS NFR BLD MANUAL: 76 % (ref 42.7–76)
NEUTS BAND NFR BLD MANUAL: 3 % (ref 0–5)
PLATELET # BLD AUTO: ABNORMAL 10*3/UL
PMV BLD AUTO: 10.4 FL (ref 6–12)
POTASSIUM SERPL-SCNC: 4.8 MMOL/L (ref 3.5–5.2)
RBC # BLD AUTO: 3.84 10*6/MM3 (ref 3.77–5.28)
RBC MORPH BLD: NORMAL
SCAN SLIDE: NORMAL
SMALL PLATELETS BLD QL SMEAR: ADEQUATE
SODIUM SERPL-SCNC: 135 MMOL/L (ref 136–145)
VARIANT LYMPHS NFR BLD MANUAL: 1 % (ref 0–5)
WBC # BLD AUTO: 7.92 10*3/MM3 (ref 3.4–10.8)
WBC MORPH BLD: NORMAL

## 2021-10-09 PROCEDURE — 94660 CPAP INITIATION&MGMT: CPT

## 2021-10-09 PROCEDURE — 25010000002 LEVOFLOXACIN PER 250 MG: Performed by: FAMILY MEDICINE

## 2021-10-09 PROCEDURE — 80048 BASIC METABOLIC PNL TOTAL CA: CPT | Performed by: FAMILY MEDICINE

## 2021-10-09 PROCEDURE — 94799 UNLISTED PULMONARY SVC/PX: CPT

## 2021-10-09 PROCEDURE — 85007 BL SMEAR W/DIFF WBC COUNT: CPT | Performed by: FAMILY MEDICINE

## 2021-10-09 PROCEDURE — 99232 SBSQ HOSP IP/OBS MODERATE 35: CPT | Performed by: FAMILY MEDICINE

## 2021-10-09 PROCEDURE — 82962 GLUCOSE BLOOD TEST: CPT

## 2021-10-09 PROCEDURE — 25010000002 CEFTRIAXONE SODIUM-DEXTROSE 1-3.74 GM-%(50ML) RECONSTITUTED SOLUTION: Performed by: FAMILY MEDICINE

## 2021-10-09 PROCEDURE — 85025 COMPLETE CBC W/AUTO DIFF WBC: CPT | Performed by: FAMILY MEDICINE

## 2021-10-09 RX ORDER — LEVOFLOXACIN 5 MG/ML
500 INJECTION, SOLUTION INTRAVENOUS
Status: DISCONTINUED | OUTPATIENT
Start: 2021-10-09 | End: 2021-10-11 | Stop reason: HOSPADM

## 2021-10-09 RX ADMIN — GABAPENTIN 300 MG: 300 CAPSULE ORAL at 09:00

## 2021-10-09 RX ADMIN — GABAPENTIN 300 MG: 300 CAPSULE ORAL at 17:00

## 2021-10-09 RX ADMIN — LEVOTHYROXINE SODIUM 75 MCG: 0.07 TABLET ORAL at 09:00

## 2021-10-09 RX ADMIN — CEFTRIAXONE 1 G: 1 INJECTION, SOLUTION INTRAVENOUS at 18:51

## 2021-10-09 RX ADMIN — FLUTICASONE PROPIONATE 2 SPRAY: 50 SPRAY, METERED NASAL at 08:57

## 2021-10-09 RX ADMIN — OXYCODONE HYDROCHLORIDE 10 MG: 5 TABLET ORAL at 17:51

## 2021-10-09 RX ADMIN — GABAPENTIN 300 MG: 300 CAPSULE ORAL at 20:53

## 2021-10-09 RX ADMIN — FAMOTIDINE 40 MG: 20 TABLET ORAL at 09:00

## 2021-10-09 RX ADMIN — OXYCODONE HYDROCHLORIDE 10 MG: 5 TABLET ORAL at 20:53

## 2021-10-09 RX ADMIN — BUSPIRONE HYDROCHLORIDE 15 MG: 15 TABLET ORAL at 09:00

## 2021-10-09 RX ADMIN — LEVOFLOXACIN 500 MG: 5 INJECTION, SOLUTION INTRAVENOUS at 21:50

## 2021-10-09 RX ADMIN — LISINOPRIL 10 MG: 10 TABLET ORAL at 09:00

## 2021-10-09 RX ADMIN — PREGABALIN 100 MG: 50 CAPSULE ORAL at 08:59

## 2021-10-09 RX ADMIN — THIAMINE HCL TAB 100 MG 50 MG: 100 TAB at 09:00

## 2021-10-09 RX ADMIN — Medication 5000 UNITS: at 09:01

## 2021-10-09 RX ADMIN — CETIRIZINE HYDROCHLORIDE 10 MG: 10 TABLET, FILM COATED ORAL at 09:00

## 2021-10-09 RX ADMIN — PREGABALIN 100 MG: 50 CAPSULE ORAL at 20:53

## 2021-10-09 RX ADMIN — CLOBETASOL PROPIONATE: 0.5 CREAM TOPICAL at 08:57

## 2021-10-09 RX ADMIN — PRAVASTATIN SODIUM 80 MG: 20 TABLET ORAL at 09:00

## 2021-10-09 RX ADMIN — SODIUM CHLORIDE, PRESERVATIVE FREE 10 ML: 5 INJECTION INTRAVENOUS at 08:59

## 2021-10-09 RX ADMIN — SODIUM CHLORIDE 100 ML/HR: 9 INJECTION, SOLUTION INTRAVENOUS at 06:44

## 2021-10-09 RX ADMIN — MONTELUKAST 10 MG: 10 TABLET, FILM COATED ORAL at 20:53

## 2021-10-09 RX ADMIN — OXYCODONE HYDROCHLORIDE 10 MG: 5 TABLET ORAL at 09:01

## 2021-10-09 RX ADMIN — PANTOPRAZOLE SODIUM 40 MG: 40 TABLET, DELAYED RELEASE ORAL at 06:44

## 2021-10-09 RX ADMIN — CETIRIZINE HYDROCHLORIDE 10 MG: 10 TABLET, FILM COATED ORAL at 20:53

## 2021-10-09 RX ADMIN — VENLAFAXINE HYDROCHLORIDE 150 MG: 150 CAPSULE, EXTENDED RELEASE ORAL at 09:00

## 2021-10-09 RX ADMIN — FLUOXETINE 60 MG: 20 CAPSULE ORAL at 08:59

## 2021-10-09 NOTE — PLAN OF CARE
Goal Outcome Evaluation:  Plan of Care Reviewed With: patient           Outcome Summary: New admit.  VSS.  Oxygenation maintained on room air and CPAP while sleeping.  No complaints during shift.  IV fluids given as ordered.  No acute events noted during shift.  Will continue to monitor patient.

## 2021-10-09 NOTE — SIGNIFICANT NOTE
Dr. Bolton notified patient has a history of sleep apnea and wears a CPAP while sleeping.  Dr. Bolton informed patient does not have her home CPAP equipment at bedside.  Dr. Bolton to place order for CPAP.  Order confirmation via read back (see orders).

## 2021-10-09 NOTE — PAYOR COMM NOTE
"TO:BC   FROM:JUANITO BERMUDEZ, RN PHONE [phone]  IN CLINICALS REF# [id number]    Estrella Recio (61 y.o. Female)             Date of Birth Social Security Number Address Home Phone MRN    1960  [address] [id number] [phone] [id number]    [facility] Mar[facility] Status             Unknown        Admission Date Admission Type Admitting Provider Attending Provider Department, Room/Bed    10/8/21 Emergency Satinder Baker MD Jabbour, Hossam, MD [facility] SURG  4, 406/1    Discharge Date Discharge Disposition Discharge Destination                         Attending Provider: Satinder Baker MD    Allergies: No Known Allergies    Isolation: None   Infection: COVID (rule out) (10/08/21)   Code Status: CPR   Advance Care Planning Activity    Ht: 165.1 cm (65\")   Wt: 89.8 kg (198 lb)    Admission Cmt: None   Principal Problem: Pyelonephritis [N12]                 Active Insurance as of 10/8/2021     Primary Coverage     Payor Plan Insurance Group Employer/Plan Group    [employer]HE[employer] BLUE CROSS AN[employer]M BLUE CROSS BLUE SHIELD PPO [id number]     Payor Plan Address Payor Plan Phone Number Payor Plan Fax Number Effective Dates    [address] [phone]  1/1/2017 - None Entered    [facility] [id number]       Subscriber Name Subscriber Birth Date Member ID       ESTRELLA RECIO 1960 [id number]           Secondary Coverage     Payor Plan Insurance Group Employer/Plan Group    [facility]      Payor Plan Address Payor Plan Phone Number Payor Plan Fax Number Effective Dates    [address] [phone]  6/7/2018 - None Entered    [facility] [id number]       Subscriber Name Subscriber Birth Date Member ID       ROSYBENNIE MARISCAL 11/8/1953 [id number]                 Emergency Contacts      (Rel.) Home Phone Work Phone Mobile Phone    LesliekylerBennie (Spouse) [phone] -- --               History & Physical      Satinder Baker MD at " 10/08/21 1843              [facility]   HISTORY AND PHYSICAL      Name:  Estrella Recio   Age:  61 y.o.  Sex:  female  :  1960  MRN:  [id number]   Visit Number:  [id number]  Admission Date:  10/8/2021  Date Of Service:  10/08/21  Primary Care Physician:  Stephanie Kulkarni MD    Chief Complaint:     Generalized weakness, right flank pain    History Of Presenting Illness:      Mrs. Recio is a 61 years old female, with history of colitis, diabetes, hyperlipidemia, hypertension, CKD 3, psoriatic arthritis and sleep apnea, who presented to the ER for right flank pain and generalized weakness. Patient reported that she had started hurting on her right flank area on  and has been consistent since then. Patient went to the ER in [place] and was diagnosed with a kidney stone and was told that her kidney stone has moved down.  Patient reports that since  she has been feeling more fatigued and generally weak all over.  She reports that she has not been able to perform her daily activities due to her fatigue. Patient reports decreased appetite and nausea however denies any vomiting.  Patient reports that she has not been able to urinate good amount lately, however patient denies dysuria or urgency.  Patient denies any hematuria.  Patient denies any fever, chills, cough, shortness of breath or chest pain.     ER work-up done and was significant for creatinine of 5.81, BUN of 86, sodium 133, WBC normal.  UA showed cloudy urine with RBC 3-5, WBC 21-30, bacteria +2, nitrates negative with trace leukocytes.  Covid test negative.  CT abdomen pelvis W/O: Mild right hydronephrosis without obstructing stone identified. Differential considerations include urinary tract infection or recently passed stone.    Review Of Systems:    All systems were reviewed and negative except as mentioned in history of presenting illness, assessment and plan.    Past Medical History: Patient  has a past  medical history of Sandra esophagus, Colitis, DDD (degenerative disc disease), cervical, Diabetes mellitus (HCC), Hyperlipidemia, Hypertension, Kidney disease, chronic, stage III (moderate, EGFR 30-59 ml/min) (HCC), Psoriatic arthritis (HCC), and Sleep apnea.    Past Surgical History: Patient  has a past surgical history that includes Cervical discectomy; Hysterectomy; Tonsillectomy; and Sinus surgery.    Social History: Patient  reports that she has never smoked. She does not have any smokeless tobacco history on file. She reports that she does not drink alcohol and does not use drugs.    Family History: Patient's family history is not on file.    Allergies:      Patient has no known allergies.    Home Medications:    Prior to Admission Medications     Prescriptions Last Dose Informant Patient Reported? Taking?    busPIRone (BUSPAR) 15 MG tablet   Yes Yes    Take 15 mg by mouth Daily.    cefdinir (OMNICEF) 300 MG capsule   Yes Yes    Take 300 mg by mouth 2 (Two) Times a Day.    famotidine (PEPCID) 40 MG tablet   Yes Yes    Take 40 mg by mouth Daily.    ketorolac (TORADOL) 10 MG tablet   Yes Yes    Take 10 mg by mouth Every 6 (Six) Hours As Needed for Moderate Pain .    montelukast (SINGULAIR) 10 MG tablet   Yes Yes    Take 10 mg by mouth Every Night.    ondansetron ODT (ZOFRAN-ODT) 4 MG disintegrating tablet   Yes Yes    Place 4 mg on the tongue Every 8 (Eight) Hours As Needed for Nausea or Vomiting.    oxyCODONE (ROXICODONE) 10 MG tablet   Yes Yes    Take 10 mg by mouth 3 (Three) Times a Day.    pravastatin (PRAVACHOL) 40 MG tablet   Yes Yes    Take 80 mg by mouth Daily.    pregabalin (LYRICA) 100 MG capsule   Yes Yes    Take 100 mg by mouth 2 (Two) Times a Day.    SITagliptin (JANUVIA) 100 MG tablet   Yes Yes    Take 100 mg by mouth Daily.    tamsulosin (FLOMAX) 0.4 MG capsule 24 hr capsule   Yes Yes    Take 1 capsule by mouth Daily.    Thiamine HCl (vitamin B-1) 50 MG tablet   Yes Yes    Take 50 mg by mouth  Daily.    venlafaxine XR (EFFEXOR-XR) 150 MG 24 hr capsule   Yes Yes    Take 150 mg by mouth Daily.    vitamin D3 125 MCG (5000 UT) capsule capsule   Yes Yes    Take 5,000 Units by mouth Daily.    atorvastatin (LIPITOR) 40 MG tablet   Yes No    Take 40 mg by mouth Daily.    cetirizine (zyrTEC) 10 MG tablet   Yes No    Take 10 mg by mouth 2 (Two) Times a Day.    clobetasol (TEMOVATE) 0.05 % ointment   Yes No    Apply  topically As Needed.    estradiol (MINIVELLE, VIVELLE-DOT) 0.075 MG/24HR patch   Yes No    Place 1 patch on the skin 2 (Two) Times a Week.    fenofibrate (TRICOR) 54 MG tablet   Yes No    Take 54 mg by mouth Daily.    FLUoxetine (PROzac) 20 MG capsule   Yes No    Take 60 mg by mouth Daily.    fluticasone (FLONASE) 50 MCG/ACT nasal spray   Yes No    2 sprays into each nostril Daily.    gabapentin (NEURONTIN) 300 MG capsule   Yes No    Take 300 mg by mouth 3 (Three) Times a Day.    levothyroxine (SYNTHROID, LEVOTHROID) 75 MCG tablet   Yes No    Take 75 mcg by mouth Daily.    lisinopril (PRINIVIL,ZESTRIL) 10 MG tablet   Yes No    Take 10 mg by mouth Daily.    loperamide (IMODIUM) 2 MG capsule   Yes No    Take 2 mg by mouth 4 (Four) Times a Day As Needed for Diarrhea.    metFORMIN (GLUCOPHAGE) 500 MG tablet   Yes No    Take 500 mg by mouth 2 (Two) Times a Day With Meals.    predniSONE (DELTASONE) 20 MG tablet   No No    2 po daily for 5 days, 1 po daily for 5 days    RABEprazole (ACIPHEX) 20 MG EC tablet   Yes No    Take 20 mg by mouth 2 (Two) Times a Day.    valACYclovir (VALTREX) 1000 MG tablet   Yes No    Take 1,000 mg by mouth 2 (Two) Times a Day As Needed.        ED Medications:    Medications   sodium chloride 0.9 % flush 10 mL (has no administration in time range)   sodium chloride 0.9 % bolus 1,000 mL (0 mL Intravenous Stopped 10/8/21 1804)   cefTRIAXone (ROCEPHIN) IVPB 1 g/50ml dextrose (premix) (1 g Intravenous New Bag 10/8/21 1801)     Vital Signs:  Temp:  [98.4 °F (36.9 °C)] 98.4 °F (36.9  °C)  Heart Rate:  [74-92] 75  Resp:  [20] 20  BP: (107-121)/(57-75) 121/69        10/08/21  1357   Weight: 89.8 kg (198 lb)     Body mass index is 32.95 kg/m².    Physical Exam:     General Appearance:  Alert and cooperative.  No acute distress.   Head:  Atraumatic and normocephalic.   Eyes: Conjunctivae and sclerae normal, no icterus. No pallor.   Ears:  Ears with no abnormalities noted.   Throat: No oral lesions, no thrush, oral mucosa moist.   Neck: Supple, trachea midline, no thyromegaly.   Back:   No kyphoscoliosis present. No tenderness to palpation.   Lungs:   Breath sounds heard bilaterally equally.  No crackles or wheezing. No Pleural rub or bronchial breathing.   Heart:  Normal S1 and S2, no murmur, no gallop, no rub. No JVD.   Abdomen:   Normal bowel sounds, no masses, no organomegaly. Soft, nontender, nondistended, no rebound tenderness.   Extremities: Supple, no edema, no cyanosis, no clubbing.   Pulses: Pulses palpable bilaterally.   Skin: No bleeding or rash.   Neurologic: Alert and oriented x 3. No facial asymmetry. Moves all four limbs. No tremors.      Laboratory data:    I have reviewed the labs done in the emergency room.    Results from last 7 days   Lab Units 10/08/21  1540   SODIUM mmol/L 133*   POTASSIUM mmol/L 4.1   CHLORIDE mmol/L 93*   CO2 mmol/L 24.5   BUN mg/dL 86*   CREATININE mg/dL 5.81*   CALCIUM mg/dL 8.9   BILIRUBIN mg/dL 0.4   ALK PHOS U/L 76   ALT (SGPT) U/L 25   AST (SGOT) U/L 24   GLUCOSE mg/dL 111*     Results from last 7 days   Lab Units 10/08/21  1540   WBC 10*3/mm3 9.33   HEMOGLOBIN g/dL 11.5*   HEMATOCRIT % 34.3   PLATELETS 10*3/mm3 152                             Results from last 7 days   Lab Units 10/08/21  1541   COLOR UA  Yellow   GLUCOSE UA  Negative   KETONES UA  Negative   LEUKOCYTES UA  Trace*   PH, URINE  <=5.0   BILIRUBIN UA  Negative   UROBILINOGEN UA  0.2 E.U./dL   RBC UA /HPF 3-5*   WBC UA /HPF 21-30*       Pain Management Panel     Pain Management Panel  Latest Ref Rng & Units 5/24/2018 3/13/2018    CREATININE UR mg/dL 230.6 76.2          Radiology:    CT Abdomen Pelvis Without Contrast    Result Date: 10/8/2021  FINAL REPORT CLINICAL HISTORY: Flank pain, kidney stone suspected FINDINGS: Technique: Axial images through the abdomen and pelvis were performed by computed tomography. This study was performed with techniques to keep radiation doses as low as reasonably achievable (ALARA). Individualized dose reduction techniques using automated exposure control or adjustment of mA and/or kV according to the patient's size were employed.  Abdomen: There is moderate bilateral lower lobe atelectasis.  The liver is normal in size and attenuation.  The gallbladder is present. The spleen is unremarkable.  The pancreas is normal.  The adrenals are normal.  The aorta is normal in caliber.  There is mild right hydronephrosis without obstructing stone, could be due to recently passed stone or infection.  There is no nephrolithiasis.  No bowel obstruction is identified.  Pelvis: The appendix is normal.  No distal ureteral stone is identified. The patient is status post hysterectomy.  The urinary bladder is unremarkable. There is no free fluid or adenopathy.     Mild right hydronephrosis without obstructing stone identified. Differential considerations include urinary tract infection or recently passed stone. Authenticated by SIMONA Lee MD on 10/08/2021 05:20:52 PM      Assessment:    1.  Acute kidney injury, POA  2.  Urinary tract infection, POA  3.  Kidney stone, improved  4.  Diabetes  5.  Hypertension    Plan:    Patient will be admitted to the floor. Patient is hemodynamically stable. We will start patient on IV fluids and continue Rocephin. Urine culture is ordered and pending.  Avoid nephrotoxic medicines.  We will follow up with a.m. labs.  Sliding scale insulin for diabetes.  Zofran and Tylenol as needed.  We will continue home meds otherwise as tolerated.    Risk  Assessment: High  DVT Prophylaxis: SCDs  Code Status: Full  Diet: Diabetic    Advance Care Planning   ACP discussion was held with the patient during this visit. Patient does not have an advance directive, declines further assistance.      Satinder Baker MD  10/08/21  18:57 EDT    Dictated utilizing Dragon dictation.    Electronically signed by Satinder Baker MD at 10/08/21 1923          Emergency Department Notes      Abdi Newton APRN at 10/08/21 1523     Attestation signed by Juliocesar Galvez DO at 10/09/21 0715          For this patient encounter, I reviewed the NP or PA documentation, treatment plan, and medical decision making. Juliocesar Galvez DO 10/9/2021 07:15 EDT                  Subjective   Chief Complaint: Flank pain   history of Present Illness: Patient recently diagnosed with right-sided kidney stones at outside hospital.  Patient states her pain improved however over the last 2 days pain has returned and has increased over the interval.  Onset: 1 week  Duration: Continuous  Exacerbating / Alleviating factors: Exacerbated by urination.  Alleviated by nothing  Associated symptoms: Nausea, dysuria    Nurses Notes reviewed and agree, including vitals, allergies, social history and prior medical history.                Review of Systems   Genitourinary: Positive for decreased urine volume and flank pain.   All other systems reviewed and are negative.      Past Medical History:   Diagnosis Date   • Sandra esophagus    • Colitis    • DDD (degenerative disc disease), cervical    • Diabetes mellitus (HCC)    • Hyperlipidemia    • Hypertension    • Kidney disease, chronic, stage III (moderate, EGFR 30-59 ml/min) (HCC)    • Psoriatic arthritis (HCC)    • Sleep apnea        No Known Allergies    Past Surgical History:   Procedure Laterality Date   • CERVICAL DISCECTOMY ANTERIOR     • HYSTERECTOMY     • SINUS SURGERY     • TONSILLECTOMY         Family History   Problem Relation Age of Onset   • Kidney  disease Neg Hx        Social History     Socioeconomic History   • Marital status:      Spouse name: Not on file   • Number of children: Not on file   • Years of education: Not on file   • Highest education level: Not on file   Tobacco Use   • Smoking status: Never Smoker   Substance and Sexual Activity   • Alcohol use: Never   • Drug use: Never           Objective   Physical Exam  Vitals and nursing note reviewed.   Constitutional:       Appearance: Normal appearance.   HENT:      Head: Normocephalic and atraumatic.   Eyes:      Extraocular Movements: Extraocular movements intact.   Cardiovascular:      Rate and Rhythm: Normal rate and regular rhythm.      Pulses: Normal pulses.      Heart sounds: Normal heart sounds.   Pulmonary:      Effort: Pulmonary effort is normal.      Breath sounds: Normal breath sounds.   Abdominal:      General: Abdomen is flat. Bowel sounds are normal.      Palpations: Abdomen is soft.      Tenderness: There is right CVA tenderness.   Musculoskeletal:         General: Normal range of motion.      Cervical back: Normal range of motion and neck supple.   Skin:     General: Skin is warm.      Capillary Refill: Capillary refill takes less than 2 seconds.   Neurological:      Mental Status: She is alert.      Comments: Himanshu Coma Scale 15, cranial nerves II through XII grossly intact, moves all extremities, alert and oriented x3   Psychiatric:         Mood and Affect: Mood normal.         Behavior: Behavior normal.         Thought Content: Thought content normal.         Judgment: Judgment normal.         Procedures          ED Course  ED Course as of Oct 08 1742   Fri Oct 08, 2021   1559 Creatinine(!): 5.81 [KH]   1619 Comprehensive Metabolic Panel(!) [KH]   1737 Discussed findings with patient.  She is agreeable to be admitted for IV antibiotics for pyelonephritis.    [KH]      ED Course User Index  [] Abdi Newton, APRN                                           [facility]    Final  diagnoses:   None       ED Disposition  ED Disposition     None          No follow-up provider specified.       Medication List      No changes were made to your prescriptions during this visit.          Abdi Newton APRN  10/08/21 1742       Abdi Newton APRN  10/08/21 1746      Electronically signed by Juliocesar Galvez DO at 10/09/21 0715     [facility] at 10/08/21 1748        House Supervisor contacted for ed assignment. Awaiting call back.     [facility]  10/08/21 1748      Electronically signed by Thakur Isabel at 10/08/21 1748     [facility] at 10/08/21 1826        Pt assigned to 406     [facility]  10/08/21 1826      Electronically signed by ThakurIsabel at 10/08/21 1826       Vital Signs (last day)     Date/Time Temp Temp src Pulse Resp BP Patient Position SpO2    10/09/21 0700 98.4 (36.9) Oral 83 20 115/64 Lying 98    10/09/21 0300 97.8 (36.6) Axillary 80 18 114/58 Lying 96    10/08/21 2300 98 (36.7) Oral 70 18 125/70 Lying 96    10/08/21 1900 98.2 (36.8) Oral 76 18 133/64 Sitting 97    10/08/21 1800 -- -- 75 -- 121/69 -- 92    10/08/21 1745 -- -- 76 -- 119/73 -- 91    10/08/21 1730 -- -- 76 -- 113/75 -- 90    10/08/21 1715 -- -- 74 -- 118/62 -- 89    10/08/21 1700 -- -- 74 -- 116/68 -- 90    10/08/21 1630 -- -- 77 -- 110/63 -- 90    10/08/21 1617 -- -- 79 -- 107/68 -- 91    10/08/21 1532 -- -- 77 -- -- -- 94    10/08/21 1530 -- -- 83 -- 118/66 -- --    10/08/21 1357 98.4 (36.9) Oral 92 20 117/57 Sitting 94            Current Facility-Administered Medications   Medication Dose Route Frequency Provider Last Rate Last Admin   • acetaminophen (TYLENOL) tablet 650 mg  650 mg Oral Q6H PRN Satinder Baker MD       • busPIRone (BUSPAR) tablet 15 mg  15 mg Oral Daily Satinder Baker MD   15 mg at 10/09/21 0900   • cefTRIAXone (ROCEPHIN) IVPB 1 g/50ml dextrose (premix)  1 g Intravenous Q24H Satinder Baker MD       • cetirizine (zyrTEC) tablet 10 mg  10 mg Oral BID Satinder Baker MD    10 mg at 10/09/21 0900   • clobetasol (TEMOVATE) 0.05 % cream   Topical Q12H Satinder Baker MD   Given at 10/09/21 0857   • dextrose (D50W) 25 g/ 50mL Intravenous Solution 25 g  25 g Intravenous Q15 Min PRN Satinder Baker MD       • dextrose (GLUTOSE) oral gel 1 tube  1 tube Oral Q15 Min PRN Satinder Baker MD       • famotidine (PEPCID) tablet 40 mg  40 mg Oral Daily Satinder Baker MD   40 mg at 10/09/21 0900   • fenofibrate (TRICOR) tablet 48 mg  48 mg Oral Daily Satinder Baker MD   48 mg at 10/08/21 2137   • FLUoxetine (PROzac) capsule 60 mg  60 mg Oral Daily Satinder Baker MD   60 mg at 10/09/21 0859   • fluticasone (FLONASE) 50 MCG/ACT nasal spray 2 spray  2 spray Nasal Daily Satinder Baker MD   2 spray at 10/09/21 0857   • gabapentin (NEURONTIN) capsule 300 mg  300 mg Oral TID Satinder Baker MD   300 mg at 10/09/21 0900   • glucagon (human recombinant) (GLUCAGEN DIAGNOSTIC) injection 1 mg  1 mg Subcutaneous PRN Satinder Baker MD       • influenza vac split quad (FLUZONE,FLUARIX,AFLURIA,FLULAVAL) injection 0.5 mL  0.5 mL Intramuscular During Hospitalization Satinder Baker MD       • insulin aspart (novoLOG) injection 0-9 Units  0-9 Units Subcutaneous TID AC Satinder Baker MD       • levothyroxine (SYNTHROID, LEVOTHROID) tablet 75 mcg  75 mcg Oral Daily Satinder Baker MD   75 mcg at 10/09/21 0900   • lisinopril (PRINIVIL,ZESTRIL) tablet 10 mg  10 mg Oral Daily Satinder Baker MD   10 mg at 10/09/21 0900   • montelukast (SINGULAIR) tablet 10 mg  10 mg Oral Nightly Satinder Baker MD   10 mg at 10/08/21 2136   • ondansetron (ZOFRAN) injection 4 mg  4 mg Intravenous Q6H PRN Satinder Baker MD       • oxyCODONE (ROXICODONE) immediate release tablet 10 mg  10 mg Oral TID Satinder Baker MD   10 mg at 10/09/21 0901   • pantoprazole (PROTONIX) EC tablet 40 mg  40 mg Oral QAM Satinder Baker MD   40 mg at 10/09/21 0644   • pravastatin (PRAVACHOL) tablet 80 mg  80 mg Oral Daily Samuel  MD Satinder   80 mg at 10/09/21 0900   • pregabalin (LYRICA) capsule 100 mg  100 mg Oral BID Satinder Baker MD   100 mg at 10/09/21 0859   • sodium chloride 0.9 % flush 10 mL  10 mL Intravenous PRN Satinder Baker MD   10 mL at 10/09/21 0859   • sodium chloride 0.9 % infusion  100 mL/hr Intravenous Continuous Satinder Baker  mL/hr at 10/09/21 0644 100 mL/hr at 10/09/21 0644   • thiamine (VITAMIN B-1) tablet 50 mg  50 mg Oral Daily Satinder Baker MD   50 mg at 10/09/21 0900   • venlafaxine XR (EFFEXOR-XR) 24 hr capsule 150 mg  150 mg Oral Daily Satinder Baker MD   150 mg at 10/09/21 0900   • vitamin D3 capsule 5,000 Units  5,000 Units Oral Daily Satinder Baker MD   5,000 Units at 10/09/21 0901       Lab Results (last 24 hours)     Procedure Component Value Units Date/Time    CBC & Differential [[id number]] Collected: 10/09/21 0925    Specimen: Blood Updated: 10/09/21 0957    Narrative:      The following orders were created for panel order CBC & Differential.  Procedure                               Abnormality         Status                     ---------                               -----------         ------                     CBC Auto Differential[[id number]]                            In process                   Please view results for these tests on the individual orders.    CBC Auto Differential [[id number]] Collected: 10/09/21 0925    Specimen: Blood Updated: 10/09/21 0957    Basic Metabolic Panel [[id number]] Collected: 10/09/21 0925    Specimen: Blood Updated: 10/09/21 0957    POC Glucose Once [[id number]]  (Normal) Collected: 10/09/21 0609    Specimen: Blood Updated: 10/09/21 0612     Glucose 100 mg/dL      Comment: Serial Number: [id number]:  [id number]       POC Glucose Once [[id number]]  (Normal) Collected: 10/08/21 2044    Specimen: Blood Updated: 10/08/21 2047     Glucose 110 mg/dL      Comment: Serial Number: [id number]:  [id number]       COVID-19 and FLU A/B PCR - Swab,  Nasopharynx [[id number]]  (Normal) Collected: 10/08/21 1806    Specimen: Swab from Nasopharynx Updated: 10/08/21 1833     COVID19 Not Detected     Influenza A PCR Not Detected     Influenza B PCR Not Detected    Narrative:      Fact sheet for providers: https://www.fda.gov/media/523326/download    Fact sheet for patients: https://www.fda.gov/media/099258/download    Test performed by PCR.    Urine Culture - Urine, Urine, Clean Catch [[id number]] Collected: 10/08/21 1541    Specimen: Urine, Clean Catch Updated: 10/08/21 1738    Urinalysis, Microscopic Only - Urine, Clean Catch [[id number]]  (Abnormal) Collected: 10/08/21 1541    Specimen: Urine, Clean Catch Updated: 10/08/21 1648     RBC, UA 3-5 /HPF      WBC, UA 21-30 /HPF      Bacteria, UA 2+ /HPF      Squamous Epithelial Cells, UA 7-12 /HPF      Hyaline Casts, UA None Seen /LPF      Mucus, UA Trace /HPF      Methodology Manual Light Microscopy    Urinalysis With Microscopic If Indicated (No Culture) - Urine, Clean Catch [[id number]]  (Abnormal) Collected: 10/08/21 1541    Specimen: Urine, Clean Catch Updated: 10/08/21 1621     Color, UA Yellow     Appearance, UA Cloudy     pH, UA <=5.0     Specific Gravity, UA 1.014     Glucose, UA Negative     Ketones, UA Negative     Bilirubin, UA Negative     Blood, UA Small (1+)     Protein,  mg/dL (2+)     Leuk Esterase, UA Trace     Nitrite, UA Negative     Urobilinogen, UA 0.2 E.U./dL    Comprehensive Metabolic Panel [[id number]]  (Abnormal) Collected: 10/08/21 1540    Specimen: Blood Updated: 10/08/21 1605     Glucose 111 mg/dL      BUN 86 mg/dL      Creatinine 5.81 mg/dL      Sodium 133 mmol/L      Potassium 4.1 mmol/L      Chloride 93 mmol/L      CO2 24.5 mmol/L      Calcium 8.9 mg/dL      Total Protein 7.1 g/dL      Albumin 3.70 g/dL      ALT (SGPT) 25 U/L      AST (SGOT) 24 U/L      Alkaline Phosphatase 76 U/L      Total Bilirubin 0.4 mg/dL      eGFR Non African Amer 7 mL/min/1.73      Comment: <15 Indicative of  kidney failure.        eGFR   Amer --     Comment: <15 Indicative of kidney failure.        Globulin 3.4 gm/dL      A/G Ratio 1.1 g/dL      BUN/Creatinine Ratio 14.8     Anion Gap 15.5 mmol/L     Narrative:      GFR Normal >60  Chronic Kidney Disease <60  Kidney Failure <15      CBC & Differential [[id number]]  (Abnormal) Collected: 10/08/21 1540    Specimen: Blood Updated: 10/08/21 1556    Narrative:      The following orders were created for panel order CBC & Differential.  Procedure                               Abnormality         Status                     ---------                               -----------         ------                     CBC Auto Differential[[id number]]        Abnormal            Final result                 Please view results for these tests on the individual orders.    CBC Auto Differential [[id number]]  (Abnormal) Collected: 10/08/21 1540    Specimen: Blood Updated: 10/08/21 1556     WBC 9.33 10*3/mm3      RBC 4.07 10*6/mm3      Hemoglobin 11.5 g/dL      Hematocrit 34.3 %      MCV 84.3 fL      MCH 28.3 pg      MCHC 33.5 g/dL      RDW 13.3 %      RDW-SD 40.9 fl      MPV 9.8 fL      Platelets 152 10*3/mm3      Neutrophil % 80.9 %      Lymphocyte % 7.8 %      Monocyte % 7.1 %      Eosinophil % 3.3 %      Basophil % 0.4 %      Immature Grans % 0.5 %      Neutrophils, Absolute 7.54 10*3/mm3      Lymphocytes, Absolute 0.73 10*3/mm3      Monocytes, Absolute 0.66 10*3/mm3      Eosinophils, Absolute 0.31 10*3/mm3      Basophils, Absolute 0.04 10*3/mm3      Immature Grans, Absolute 0.05 10*3/mm3      nRBC 0.0 /100 WBC           Physician Progress Notes (last 48 hours)  Notes from 10/07/21 1002 through 10/09/21 1002   No notes of this type exist for this encounter.

## 2021-10-09 NOTE — PROGRESS NOTES
[facility]    Name:  Estrella Recio   Age:  61 y.o.  Sex:  female  :  1960  MRN:  [id number]   Visit Number:  [id number]  Admission Date:  10/8/2021  Date Of Service:  10/09/21  Primary Care Physician:  Stephanie Kulkarni MD     LOS: 1 day :    Chief Complaint:      Generalized weakness, right flank pain    Subjective:    Pt was seen and examined today. Patient reports feeling much better today. Patient reports that her fatigue has been improving since yesterday. Patient continues to tolerate PO well with no nausea or vomiting. No other acute complaints at this time. Pt reporting good urine output overnight. Pt denies dysuria.     Hospital Course:  Mrs. Recio is a 61 years old female, with history of colitis, diabetes, hyperlipidemia, hypertension, CKD 3, psoriatic arthritis and sleep apnea, who presented to the ER for right flank pain and generalized weakness. Patient reported that she had started hurting on her right flank area on  and has been consistent since then. Patient went to the ER in [place] and was diagnosed with a kidney stone and was told that her kidney stone has moved down.  Patient reports that since  she has been feeling more fatigued and generally weak all over.  She reports that she has not been able to perform her daily activities due to her fatigue. Patient reports decreased appetite and nausea however denies any vomiting.  Patient reports that she has not been able to urinate good amount lately, however patient denies dysuria or urgency.  Patient denies any hematuria.  Patient denies any fever, chills, cough, shortness of breath or chest pain.      ER work-up done and was significant for creatinine of 5.81, BUN of 86, sodium 133, WBC normal.  UA showed cloudy urine with RBC 3-5, WBC 21-30, bacteria +2, nitrates negative with trace leukocytes.  Covid test negative.  CT abdomen pelvis W/O: Mild right hydronephrosis without  obstructing stone identified. Differential considerations include urinary tract infection or recently passed stone.    Review of Systems:     All systems were reviewed and negative except as mentioned in subjective, assessment and plan.    Vital Signs:    Temp:  [97.8 °F (36.6 °C)-98.4 °F (36.9 °C)] 98.1 °F (36.7 °C)  Heart Rate:  [70-92] 80  Resp:  [18-20] 18  BP: (107-133)/(57-75) 112/63    Intake and output:    I/O last 3 completed shifts:  In: 1859.5 [I.V.:1859.5]  Out: 300 [Urine:300]  I/O this shift:  In: 240 [P.O.:240]  Out: 200 [Urine:200]    Physical Examination:    General Appearance:  Alert and cooperative. No acute distress.    Head:  Atraumatic and normocephalic.   Eyes: Conjunctivae and sclerae normal, no icterus. No pallor.   Throat: No oral lesions, no thrush, oral mucosa moist.   Neck: Supple, trachea midline, no thyromegaly.   Lungs:   Breath sounds heard bilaterally equally.  No crackles or wheezing. No Pleural rub or bronchial breathing.   Heart:  Normal S1 and S2, no murmur, no gallop, no rub. No JVD.   Abdomen:   Normal bowel sounds, no masses, no organomegaly. Soft, nontender, nondistended, no rebound tenderness. No CVA tenderness.    Extremities: Supple, no edema, no cyanosis, no clubbing.   Skin: No bleeding or rash.   Neurologic: Alert and oriented x 3. No facial asymmetry. Moves all four limbs. No tremors.      Laboratory results:    Results from last 7 days   Lab Units 10/09/21  0925 10/08/21  1540   SODIUM mmol/L 135* 133*   POTASSIUM mmol/L 4.8 4.1   CHLORIDE mmol/L 101 93*   CO2 mmol/L 18.7* 24.5   BUN mg/dL 81* 86*   CREATININE mg/dL 5.03* 5.81*   CALCIUM mg/dL 8.4* 8.9   BILIRUBIN mg/dL  --  0.4   ALK PHOS U/L  --  76   ALT (SGPT) U/L  --  25   AST (SGOT) U/L  --  24   GLUCOSE mg/dL 119* 111*     Results from last 7 days   Lab Units 10/09/21  0925 10/08/21  1540   WBC 10*3/mm3 7.92 9.33   HEMOGLOBIN g/dL 10.9* 11.5*   HEMATOCRIT % 31.6* 34.3   PLATELETS 10*3/mm3  --  152              Results from last 7 days   Lab Units 10/08/21  1541   URINECX  No growth         I have reviewed the patient's laboratory results.    Radiology results:    CT Abdomen Pelvis Without Contrast    Result Date: 10/8/2021  FINAL REPORT CLINICAL HISTORY: Flank pain, kidney stone suspected FINDINGS: Technique: Axial images through the abdomen and pelvis were performed by computed tomography. This study was performed with techniques to keep radiation doses as low as reasonably achievable (ALARA). Individualized dose reduction techniques using automated exposure control or adjustment of mA and/or kV according to the patient's size were employed.  Abdomen: There is moderate bilateral lower lobe atelectasis.  The liver is normal in size and attenuation.  The gallbladder is present. The spleen is unremarkable.  The pancreas is normal.  The adrenals are normal.  The aorta is normal in caliber.  There is mild right hydronephrosis without obstructing stone, could be due to recently passed stone or infection.  There is no nephrolithiasis.  No bowel obstruction is identified.  Pelvis: The appendix is normal.  No distal ureteral stone is identified. The patient is status post hysterectomy.  The urinary bladder is unremarkable. There is no free fluid or adenopathy.     Impression: Mild right hydronephrosis without obstructing stone identified. Differential considerations include urinary tract infection or recently passed stone. Authenticated by SIMONA Lee MD on 10/08/2021 05:20:52 PM    I have reviewed the patient's radiology reports.    Medication Review:     I have reviewed the patient's active and prn medications.     Problem List:      Pyelonephritis    Acute kidney injury (HCC)      Assessment:    1.  Acute kidney injury, POA  2.  Urinary tract infection, POA  3.  Kidney stone, improved  4.  Diabetes  5.  Hypertension    Plan:    Patient will be admitted to the floor. Patient is hemodynamically stable. We will start  patient on IV fluids and continue Rocephin. Urine culture is ordered and pending.  Avoid nephrotoxic medicines.  We will follow up with a.m. labs.  Sliding scale insulin for diabetes.  Zofran and Tylenol as needed.  We will continue home meds otherwise as tolerated.    10/09: Patient reported that she was started on Keflex On Monday after her ER visit which did not seems to help on her symptoms. We will switch her Abx to Levaquin with pharmacy to dose. Cr trending down. Continue IVF. We will follow up on urine cultures.      DVT Prophylaxis: SCDs  Code Status: Full  Diet: Diabetic  Discharge Plan: pending    Satinder Baker MD  10/09/21  12:22 EDT    Dictated utilizing Dragon dictation.

## 2021-10-09 NOTE — PLAN OF CARE
Goal Outcome Evaluation:  Plan of Care Reviewed With: patient        Progress: improving   Estrella is A+O x 4. Up ad navdeep. Her pain is managed per MAR. IV fluids and antibiotics continue. She denies SOA.

## 2021-10-10 ENCOUNTER — APPOINTMENT (OUTPATIENT)
Dept: GENERAL RADIOLOGY | Facility: HOSPITAL | Age: 61
End: 2021-10-10

## 2021-10-10 LAB
ANION GAP SERPL CALCULATED.3IONS-SCNC: 13.8 MMOL/L (ref 5–15)
BUN SERPL-MCNC: 63 MG/DL (ref 8–23)
BUN/CREAT SERPL: 17 (ref 7–25)
CALCIUM SPEC-SCNC: 8.1 MG/DL (ref 8.6–10.5)
CHLORIDE SERPL-SCNC: 103 MMOL/L (ref 98–107)
CO2 SERPL-SCNC: 18.2 MMOL/L (ref 22–29)
CREAT SERPL-MCNC: 3.71 MG/DL (ref 0.57–1)
DEPRECATED RDW RBC AUTO: 42.5 FL (ref 37–54)
ERYTHROCYTE [DISTWIDTH] IN BLOOD BY AUTOMATED COUNT: 13.6 % (ref 12.3–15.4)
GFR SERPL CREATININE-BSD FRML MDRD: 12 ML/MIN/1.73
GFR SERPL CREATININE-BSD FRML MDRD: ABNORMAL ML/MIN/{1.73_M2}
GLUCOSE BLDC GLUCOMTR-MCNC: 107 MG/DL (ref 70–130)
GLUCOSE BLDC GLUCOMTR-MCNC: 118 MG/DL (ref 70–130)
GLUCOSE BLDC GLUCOMTR-MCNC: 120 MG/DL (ref 70–130)
GLUCOSE BLDC GLUCOMTR-MCNC: 92 MG/DL (ref 70–130)
GLUCOSE SERPL-MCNC: 107 MG/DL (ref 65–99)
HCT VFR BLD AUTO: 30.8 % (ref 34–46.6)
HGB BLD-MCNC: 10.3 G/DL (ref 12–15.9)
MCH RBC QN AUTO: 28.4 PG (ref 26.6–33)
MCHC RBC AUTO-ENTMCNC: 33.4 G/DL (ref 31.5–35.7)
MCV RBC AUTO: 84.8 FL (ref 79–97)
PLATELET # BLD AUTO: 177 10*3/MM3 (ref 140–450)
PMV BLD AUTO: 9.6 FL (ref 6–12)
POTASSIUM SERPL-SCNC: 4.7 MMOL/L (ref 3.5–5.2)
RBC # BLD AUTO: 3.63 10*6/MM3 (ref 3.77–5.28)
SODIUM SERPL-SCNC: 135 MMOL/L (ref 136–145)
WBC # BLD AUTO: 7.98 10*3/MM3 (ref 3.4–10.8)

## 2021-10-10 PROCEDURE — 94799 UNLISTED PULMONARY SVC/PX: CPT

## 2021-10-10 PROCEDURE — 99232 SBSQ HOSP IP/OBS MODERATE 35: CPT | Performed by: FAMILY MEDICINE

## 2021-10-10 PROCEDURE — 85027 COMPLETE CBC AUTOMATED: CPT | Performed by: FAMILY MEDICINE

## 2021-10-10 PROCEDURE — 82962 GLUCOSE BLOOD TEST: CPT

## 2021-10-10 PROCEDURE — 71045 X-RAY EXAM CHEST 1 VIEW: CPT

## 2021-10-10 PROCEDURE — 80048 BASIC METABOLIC PNL TOTAL CA: CPT | Performed by: FAMILY MEDICINE

## 2021-10-10 PROCEDURE — 94660 CPAP INITIATION&MGMT: CPT

## 2021-10-10 RX ORDER — GUAIFENESIN 600 MG/1
600 TABLET, EXTENDED RELEASE ORAL EVERY 12 HOURS SCHEDULED
Status: DISCONTINUED | OUTPATIENT
Start: 2021-10-10 | End: 2021-10-11 | Stop reason: HOSPADM

## 2021-10-10 RX ADMIN — LISINOPRIL 10 MG: 10 TABLET ORAL at 08:46

## 2021-10-10 RX ADMIN — GABAPENTIN 300 MG: 300 CAPSULE ORAL at 17:16

## 2021-10-10 RX ADMIN — OXYCODONE HYDROCHLORIDE 10 MG: 5 TABLET ORAL at 20:35

## 2021-10-10 RX ADMIN — CETIRIZINE HYDROCHLORIDE 10 MG: 10 TABLET, FILM COATED ORAL at 08:46

## 2021-10-10 RX ADMIN — PREGABALIN 100 MG: 50 CAPSULE ORAL at 08:45

## 2021-10-10 RX ADMIN — OXYCODONE HYDROCHLORIDE 10 MG: 5 TABLET ORAL at 08:46

## 2021-10-10 RX ADMIN — PREGABALIN 100 MG: 50 CAPSULE ORAL at 20:35

## 2021-10-10 RX ADMIN — FAMOTIDINE 40 MG: 20 TABLET ORAL at 08:46

## 2021-10-10 RX ADMIN — SODIUM CHLORIDE 100 ML/HR: 9 INJECTION, SOLUTION INTRAVENOUS at 14:19

## 2021-10-10 RX ADMIN — BUSPIRONE HYDROCHLORIDE 15 MG: 15 TABLET ORAL at 08:46

## 2021-10-10 RX ADMIN — CETIRIZINE HYDROCHLORIDE 10 MG: 10 TABLET, FILM COATED ORAL at 20:35

## 2021-10-10 RX ADMIN — FLUOXETINE 60 MG: 20 CAPSULE ORAL at 08:45

## 2021-10-10 RX ADMIN — FLUTICASONE PROPIONATE 2 SPRAY: 50 SPRAY, METERED NASAL at 08:48

## 2021-10-10 RX ADMIN — THIAMINE HCL TAB 100 MG 50 MG: 100 TAB at 08:46

## 2021-10-10 RX ADMIN — GUAIFENESIN 600 MG: 600 TABLET ORAL at 22:51

## 2021-10-10 RX ADMIN — Medication 5000 UNITS: at 08:46

## 2021-10-10 RX ADMIN — OXYCODONE HYDROCHLORIDE 10 MG: 5 TABLET ORAL at 17:16

## 2021-10-10 RX ADMIN — LEVOTHYROXINE SODIUM 75 MCG: 0.07 TABLET ORAL at 08:46

## 2021-10-10 RX ADMIN — SODIUM CHLORIDE 100 ML/HR: 9 INJECTION, SOLUTION INTRAVENOUS at 02:14

## 2021-10-10 RX ADMIN — GABAPENTIN 300 MG: 300 CAPSULE ORAL at 20:35

## 2021-10-10 RX ADMIN — CLOBETASOL PROPIONATE: 0.5 CREAM TOPICAL at 08:48

## 2021-10-10 RX ADMIN — VENLAFAXINE HYDROCHLORIDE 150 MG: 150 CAPSULE, EXTENDED RELEASE ORAL at 08:45

## 2021-10-10 RX ADMIN — MONTELUKAST 10 MG: 10 TABLET, FILM COATED ORAL at 20:35

## 2021-10-10 RX ADMIN — FENOFIBRATE 48 MG: 48 TABLET ORAL at 08:46

## 2021-10-10 RX ADMIN — PANTOPRAZOLE SODIUM 40 MG: 40 TABLET, DELAYED RELEASE ORAL at 06:34

## 2021-10-10 RX ADMIN — PRAVASTATIN SODIUM 80 MG: 20 TABLET ORAL at 08:45

## 2021-10-10 RX ADMIN — GABAPENTIN 300 MG: 300 CAPSULE ORAL at 08:46

## 2021-10-10 RX ADMIN — SODIUM CHLORIDE 100 ML/HR: 9 INJECTION, SOLUTION INTRAVENOUS at 22:50

## 2021-10-10 NOTE — PLAN OF CARE
Goal Outcome Evaluation:  Plan of Care Reviewed With: patient           Outcome Summary: VSS.  Oxygenation maintained on room air and CPAP while sleeping.  Pain controlled with oral scheduled medication (see MAR).  IV fluids and antibiotic given as ordered (see MAR).  No complaints during shift.  No acute events noted during shift.  Will continue to monitor patient.

## 2021-10-10 NOTE — PLAN OF CARE
Goal Outcome Evaluation:  Plan of Care Reviewed With: patient   Patient has been stable this shift, vitals WNL. No pain with urination, good urine output. Anticipating discharge tomorrow per patient.      Progress: improving

## 2021-10-11 ENCOUNTER — READMISSION MANAGEMENT (OUTPATIENT)
Dept: CALL CENTER | Facility: HOSPITAL | Age: 61
End: 2021-10-11

## 2021-10-11 VITALS
SYSTOLIC BLOOD PRESSURE: 142 MMHG | RESPIRATION RATE: 18 BRPM | HEART RATE: 82 BPM | TEMPERATURE: 97.2 F | DIASTOLIC BLOOD PRESSURE: 71 MMHG | BODY MASS INDEX: 32.99 KG/M2 | HEIGHT: 65 IN | WEIGHT: 198 LBS | OXYGEN SATURATION: 94 %

## 2021-10-11 LAB
ANION GAP SERPL CALCULATED.3IONS-SCNC: 9.2 MMOL/L (ref 5–15)
BASOPHILS # BLD AUTO: 0.05 10*3/MM3 (ref 0–0.2)
BASOPHILS NFR BLD AUTO: 0.6 % (ref 0–1.5)
BUN SERPL-MCNC: 43 MG/DL (ref 8–23)
BUN/CREAT SERPL: 17.6 (ref 7–25)
CALCIUM SPEC-SCNC: 8.6 MG/DL (ref 8.6–10.5)
CHLORIDE SERPL-SCNC: 107 MMOL/L (ref 98–107)
CO2 SERPL-SCNC: 23.8 MMOL/L (ref 22–29)
CREAT SERPL-MCNC: 2.45 MG/DL (ref 0.57–1)
DEPRECATED RDW RBC AUTO: 43.6 FL (ref 37–54)
EOSINOPHIL # BLD AUTO: 0.22 10*3/MM3 (ref 0–0.4)
EOSINOPHIL NFR BLD AUTO: 2.8 % (ref 0.3–6.2)
ERYTHROCYTE [DISTWIDTH] IN BLOOD BY AUTOMATED COUNT: 13.6 % (ref 12.3–15.4)
GFR SERPL CREATININE-BSD FRML MDRD: 20 ML/MIN/1.73
GLUCOSE BLDC GLUCOMTR-MCNC: 92 MG/DL (ref 70–130)
GLUCOSE BLDC GLUCOMTR-MCNC: 97 MG/DL (ref 70–130)
GLUCOSE SERPL-MCNC: 94 MG/DL (ref 65–99)
HCT VFR BLD AUTO: 32.3 % (ref 34–46.6)
HGB BLD-MCNC: 10.5 G/DL (ref 12–15.9)
IMM GRANULOCYTES # BLD AUTO: 0.1 10*3/MM3 (ref 0–0.05)
IMM GRANULOCYTES NFR BLD AUTO: 1.3 % (ref 0–0.5)
LYMPHOCYTES # BLD AUTO: 1.65 10*3/MM3 (ref 0.7–3.1)
LYMPHOCYTES NFR BLD AUTO: 21 % (ref 19.6–45.3)
MCH RBC QN AUTO: 28.5 PG (ref 26.6–33)
MCHC RBC AUTO-ENTMCNC: 32.5 G/DL (ref 31.5–35.7)
MCV RBC AUTO: 87.5 FL (ref 79–97)
MONOCYTES # BLD AUTO: 0.55 10*3/MM3 (ref 0.1–0.9)
MONOCYTES NFR BLD AUTO: 7 % (ref 5–12)
NEUTROPHILS NFR BLD AUTO: 5.27 10*3/MM3 (ref 1.7–7)
NEUTROPHILS NFR BLD AUTO: 67.3 % (ref 42.7–76)
NRBC BLD AUTO-RTO: 0 /100 WBC (ref 0–0.2)
PLATELET # BLD AUTO: 182 10*3/MM3 (ref 140–450)
PMV BLD AUTO: 9.3 FL (ref 6–12)
POTASSIUM SERPL-SCNC: 4.8 MMOL/L (ref 3.5–5.2)
RBC # BLD AUTO: 3.69 10*6/MM3 (ref 3.77–5.28)
RBC MORPH BLD: NORMAL
SMALL PLATELETS BLD QL SMEAR: ADEQUATE
SODIUM SERPL-SCNC: 140 MMOL/L (ref 136–145)
WBC # BLD AUTO: 7.84 10*3/MM3 (ref 3.4–10.8)
WBC MORPH BLD: NORMAL

## 2021-10-11 PROCEDURE — 94799 UNLISTED PULMONARY SVC/PX: CPT

## 2021-10-11 PROCEDURE — 99239 HOSP IP/OBS DSCHRG MGMT >30: CPT | Performed by: FAMILY MEDICINE

## 2021-10-11 PROCEDURE — 94660 CPAP INITIATION&MGMT: CPT

## 2021-10-11 PROCEDURE — 82962 GLUCOSE BLOOD TEST: CPT

## 2021-10-11 PROCEDURE — G0008 ADMIN INFLUENZA VIRUS VAC: HCPCS | Performed by: FAMILY MEDICINE

## 2021-10-11 PROCEDURE — 85025 COMPLETE CBC W/AUTO DIFF WBC: CPT | Performed by: FAMILY MEDICINE

## 2021-10-11 PROCEDURE — 90686 IIV4 VACC NO PRSV 0.5 ML IM: CPT | Performed by: FAMILY MEDICINE

## 2021-10-11 PROCEDURE — 25010000002 INFLUENZA VAC SPLIT QUAD 0.5 ML SUSPENSION PREFILLED SYRINGE: Performed by: FAMILY MEDICINE

## 2021-10-11 PROCEDURE — 85007 BL SMEAR W/DIFF WBC COUNT: CPT | Performed by: FAMILY MEDICINE

## 2021-10-11 PROCEDURE — 80048 BASIC METABOLIC PNL TOTAL CA: CPT | Performed by: FAMILY MEDICINE

## 2021-10-11 RX ORDER — LISINOPRIL 10 MG/1
10 TABLET ORAL DAILY
Start: 2021-10-11

## 2021-10-11 RX ORDER — LEVOFLOXACIN 500 MG/1
500 TABLET, FILM COATED ORAL EVERY OTHER DAY
Qty: 2 TABLET | Refills: 0 | Status: SHIPPED | OUTPATIENT
Start: 2021-10-11 | End: 2021-10-15

## 2021-10-11 RX ADMIN — GUAIFENESIN 600 MG: 600 TABLET ORAL at 08:24

## 2021-10-11 RX ADMIN — PREGABALIN 100 MG: 50 CAPSULE ORAL at 08:25

## 2021-10-11 RX ADMIN — VENLAFAXINE HYDROCHLORIDE 150 MG: 150 CAPSULE, EXTENDED RELEASE ORAL at 08:25

## 2021-10-11 RX ADMIN — INFLUENZA VIRUS VACCINE 0.5 ML: 15; 15; 15; 15 SUSPENSION INTRAMUSCULAR at 13:04

## 2021-10-11 RX ADMIN — THIAMINE HCL TAB 100 MG 50 MG: 100 TAB at 08:24

## 2021-10-11 RX ADMIN — Medication 5000 UNITS: at 08:24

## 2021-10-11 RX ADMIN — FLUOXETINE 60 MG: 20 CAPSULE ORAL at 08:24

## 2021-10-11 RX ADMIN — SODIUM CHLORIDE 100 ML/HR: 9 INJECTION, SOLUTION INTRAVENOUS at 06:34

## 2021-10-11 RX ADMIN — FENOFIBRATE 48 MG: 48 TABLET ORAL at 08:25

## 2021-10-11 RX ADMIN — PANTOPRAZOLE SODIUM 40 MG: 40 TABLET, DELAYED RELEASE ORAL at 06:35

## 2021-10-11 RX ADMIN — OXYCODONE HYDROCHLORIDE 10 MG: 5 TABLET ORAL at 08:25

## 2021-10-11 RX ADMIN — LEVOTHYROXINE SODIUM 75 MCG: 0.07 TABLET ORAL at 08:25

## 2021-10-11 RX ADMIN — FLUTICASONE PROPIONATE 2 SPRAY: 50 SPRAY, METERED NASAL at 08:34

## 2021-10-11 RX ADMIN — CETIRIZINE HYDROCHLORIDE 10 MG: 10 TABLET, FILM COATED ORAL at 08:25

## 2021-10-11 RX ADMIN — FAMOTIDINE 40 MG: 20 TABLET ORAL at 08:24

## 2021-10-11 RX ADMIN — LISINOPRIL 10 MG: 10 TABLET ORAL at 08:23

## 2021-10-11 RX ADMIN — CLOBETASOL PROPIONATE: 0.5 CREAM TOPICAL at 08:34

## 2021-10-11 RX ADMIN — BUSPIRONE HYDROCHLORIDE 15 MG: 15 TABLET ORAL at 08:25

## 2021-10-11 RX ADMIN — PRAVASTATIN SODIUM 80 MG: 20 TABLET ORAL at 08:23

## 2021-10-11 RX ADMIN — GABAPENTIN 300 MG: 300 CAPSULE ORAL at 08:24

## 2021-10-11 NOTE — PROGRESS NOTES
[facility]    Name:  Estrella Recio   Age:  61 y.o.  Sex:  female  :  1960  MRN:  [id number]   Visit Number:  [id number]  Admission Date:  10/8/2021  Date Of Service:  10/10/21  Primary Care Physician:  Stephanie Kulkarni MD     LOS: 2 days :    Chief Complaint:      Generalized weakness, right flank pain    Subjective:    Pt was seen and examined today. Patient reporting chest congestion and mild cough. No fever or chills. No SOB or CP. Patient continues to report improvement on her generalized weakness and fatigue today. Great urine output.     Hospital Course:  Mrs. Recio is a 61 years old female, with history of colitis, diabetes, hyperlipidemia, hypertension, CKD 3, psoriatic arthritis and sleep apnea, who presented to the ER for right flank pain and generalized weakness. Patient reported that she had started hurting on her right flank area on  and has been consistent since then. Patient went to the ER in [place] and was diagnosed with a kidney stone and was told that her kidney stone has moved down.  Patient reports that since  she has been feeling more fatigued and generally weak all over.  She reports that she has not been able to perform her daily activities due to her fatigue. Patient reports decreased appetite and nausea however denies any vomiting.  Patient reports that she has not been able to urinate good amount lately, however patient denies dysuria or urgency.  Patient denies any hematuria.  Patient denies any fever, chills, cough, shortness of breath or chest pain.      ER work-up done and was significant for creatinine of 5.81, BUN of 86, sodium 133, WBC normal.  UA showed cloudy urine with RBC 3-5, WBC 21-30, bacteria +2, nitrates negative with trace leukocytes.  Covid test negative.  CT abdomen pelvis W/O: Mild right hydronephrosis without obstructing stone identified. Differential considerations include urinary tract infection or  recently passed stone.    Review of Systems:     All systems were reviewed and negative except as mentioned in subjective, assessment and plan.    Vital Signs:    Temp:  [97.3 °F (36.3 °C)-98 °F (36.7 °C)] 98 °F (36.7 °C)  Heart Rate:  [72-89] 89  Resp:  [18-20] 18  BP: (126-138)/(63-77) 138/76    Intake and output:    I/O last 3 completed shifts:  In: 4196.1 [P.O.:1920; I.V.:2126.1; IV Piggyback:150]  Out: 1800 [Urine:1800]  I/O this shift:  In: -   Out: 850 [Urine:850]    Physical Examination:    General Appearance:  Alert and cooperative. No acute distress.    Head:  Atraumatic and normocephalic.   Eyes: Conjunctivae and sclerae normal, no icterus. No pallor.   Throat: No oral lesions, no thrush, oral mucosa moist.   Neck: Supple, trachea midline, no thyromegaly.   Lungs:   Breath sounds heard bilaterally equally.  No crackles or wheezing. No Pleural rub or bronchial breathing.   Heart:  Normal S1 and S2, no murmur, no gallop, no rub. No JVD.   Abdomen:   Normal bowel sounds, no masses, no organomegaly. Soft, nontender, nondistended, no rebound tenderness. No CVA tenderness.    Extremities: Supple, no edema, no cyanosis, no clubbing.   Skin: No bleeding or rash.   Neurologic: Alert and oriented x 3. No facial asymmetry. Moves all four limbs. No tremors.      Laboratory results:    Results from last 7 days   Lab Units 10/10/21  0625 10/09/21  0925 10/08/21  1540   SODIUM mmol/L 135* 135* 133*   POTASSIUM mmol/L 4.7 4.8 4.1   CHLORIDE mmol/L 103 101 93*   CO2 mmol/L 18.2* 18.7* 24.5   BUN mg/dL 63* 81* 86*   CREATININE mg/dL 3.71* 5.03* 5.81*   CALCIUM mg/dL 8.1* 8.4* 8.9   BILIRUBIN mg/dL  --   --  0.4   ALK PHOS U/L  --   --  76   ALT (SGPT) U/L  --   --  25   AST (SGOT) U/L  --   --  24   GLUCOSE mg/dL 107* 119* 111*     Results from last 7 days   Lab Units 10/10/21  0619 10/09/21  0925 10/08/21  1540   WBC 10*3/mm3 7.98 7.92 9.33   HEMOGLOBIN g/dL 10.3* 10.9* 11.5*   HEMATOCRIT % 30.8* 31.6* 34.3   PLATELETS  10*3/mm3 177  --  152             Results from last 7 days   Lab Units 10/08/21  1541   URINECX  No growth         I have reviewed the patient's laboratory results.    Radiology results:    No radiology results from the last 24 hrs  I have reviewed the patient's radiology reports.    Medication Review:     I have reviewed the patient's active and prn medications.     Problem List:      Pyelonephritis    Acute kidney injury (HCC)      Assessment:    1.  Acute kidney injury, POA  2.  Urinary tract infection, POA  3.  Kidney stone, improved  4.  Diabetes  5.  Hypertension    Plan:    Patient will be admitted to the floor. Patient is hemodynamically stable. We will start patient on IV fluids and continue Rocephin. Urine culture is ordered and pending.  Avoid nephrotoxic medicines.  We will follow up with a.m. labs.  Sliding scale insulin for diabetes.  Zofran and Tylenol as needed.  We will continue home meds otherwise as tolerated.    10/09: Patient reported that she was started on Keflex On Monday after her ER visit which did not seems to help on her symptoms. We will switch her Abx to Levaquin with pharmacy to dose. Cr trending down. Continue IVF. We will follow up on urine cultures.      10/10: We will check CXR. Mucinex. Cr trending down and today 3.71. We will continue with ABX and IVF and will follow up on urine cultures.  AM labs. Plan for discharge in AM if she continues to improve.       DVT Prophylaxis: SCDs  Code Status: Full  Diet: Diabetic  Discharge Plan: pending    Saitnder Baker MD  10/10/21  22:09 EDT    Dictated utilizing Dragon dictation.

## 2021-10-11 NOTE — CASE MANAGEMENT/SOCIAL WORK
Case Management Discharge Note                Selected Continued Care - Admitted Since 10/8/2021     Destination    No services have been selected for the patient.              Durable Medical Equipment    No services have been selected for the patient.              Dialysis/Infusion    No services have been selected for the patient.              Home Medical Care    No services have been selected for the patient.              Therapy    No services have been selected for the patient.              Community Resources    No services have been selected for the patient.              [facility]    No services have been selected for the patient.                  Transportation Services  Private: Car    Final Discharge Disposition Code: 01 - home or self-care

## 2021-10-11 NOTE — PLAN OF CARE
Goal Outcome Evaluation:  Plan of Care Reviewed With: patient        Progress: improving  Outcome Summary: VSS.  Oxygenation maintained on room air while awake and CPAP while sleeping.  No complaints during shift.  Pain controlled with scheduled oral medication (see MAR).  IV fluids given as ordered (see MAR).  Patient ambulated to toilet independently.  Ordered chest x-ray completed.  No acute events noted during shift.  Will continue to monitor patient.

## 2021-10-12 NOTE — OUTREACH NOTE
Prep Survey      Responses   [facility] facility patient discharged from? [facility]   Is LACE score < 7 ? No   Emergency Room discharge w/ pulse ox? No   Eligibility Readm Mgmt   Discharge diagnosis Pyelonephritis   Does the patient have one of the following disease processes/diagnoses(primary or secondary)? Other   Does the patient have Home health ordered? No   Is there a DME ordered? No   Prep survey completed? Yes          RAMON Kennedy RN

## 2021-10-12 NOTE — DISCHARGE SUMMARY
[facility]   DISCHARGE SUMMARY      Name:  Estrella Recio   Age:  61 y.o.  Sex:  female  :  1960  MRN:  [id number]   Visit Number:  [id number]    Admission Date:  10/8/2021  Date of Discharge:  10/12/2021  Primary Care Physician:  Stephanie Kulkarni MD    Important issues to note:    Please follow-up with PCP in regards to repeat creatinine/renal function tests in 1 week.  Hold antihyperglycemic until follow-up.  Avoid NSAIDs.  Complete treatment with Levaquin.    Discharge Diagnoses:     1.  Acute kidney injury, POA  2.  Urinary tract infection, POA  3.  Kidney stone, improved  4.  Diabetes  5.  Hypertension    Problem List:     Active Hospital Problems    Diagnosis  POA   • **Pyelonephritis [N12]  Yes   • Acute kidney injury (HCC) [N17.9]  Yes      Resolved Hospital Problems   No resolved problems to display.     Presenting Problem:    Chief Complaint   Patient presents with   • Flank Pain   • Dizziness      Consults:     Consulting Physician(s)             None          Procedures Performed:        History of presenting illness/Hospital Course:    61-year-old female with history of colitis, diabetes, hypertension, hyperlipidemia, chronic kidney disease stage III, psoriatic arthritis, sleep apnea, who presented emergency room with complaints of right flank pain and generalized weakness.  Had recent diagnosis of right-sided renal stone and possible UTI from outlying emergency room.  He was admitted to the hospital service with evidence of acute kidney injury with creatinine of 5.81 and BUN of 86 as well as urinalysis concerning for infection.  CT scan abdomen pelvis demonstrated mild right-sided hydronephrosis with no obstructing stone.  She was placed on antibiotic coverage in addition to IV fluid resuscitation and avoidance of nephrotoxic agents.  She has significant provement in her overall symptomatology, urine output, and creatinine over the last 48 hours.  She is  otherwise eating, drinking, voiding well.  No fevers.  Labs are stable.  Discussed close follow-up with PCP in regards to further monitoring of kidney function moving forward.  Avoidance of NSAIDs over the next few days as well as holding of antihyperglycemic agent.  All questions answered day of discharge.    Vital Signs:    Heart Rate:  [82] 82  Resp:  [18] 18  BP: (142)/(71) 142/71    Physical Exam:    General Appearance:  Alert and cooperative.  No acute distress   Head:  Atraumatic and normocephalic.   Eyes: Conjunctivae and sclerae normal, no icterus. No pallor.   Ears:  Ears with no abnormalities noted.   Throat: No oral lesions, no thrush, oral mucosa moist.   Neck: Supple, trachea midline, no thyromegaly.   Back:   No kyphoscoliosis present. No tenderness to palpation.   Lungs:   Breath sounds heard bilaterally equally.  No crackles or wheezing. No Pleural rub or bronchial breathing.   Heart:  Normal S1 and S2, no murmur, no gallop, no rub. No JVD.   Abdomen:   Normal bowel sounds, no masses, no organomegaly. Soft, nontender, nondistended, no rebound tenderness.   Extremities: Supple, no edema, no cyanosis, no clubbing.   Pulses: Pulses palpable bilaterally.   Skin: No bleeding or rash.   Neurologic: Alert and oriented x 3. No facial asymmetry. Moves all four limbs. No tremors.     Pertinent Lab Results:     Results from last 7 days   Lab Units 10/11/21  0625 10/10/21  0625 10/09/21  0925 10/08/21  1540 10/08/21  1540   SODIUM mmol/L 140 135* 135*   < > 133*   POTASSIUM mmol/L 4.8 4.7 4.8   < > 4.1   CHLORIDE mmol/L 107 103 101   < > 93*   CO2 mmol/L 23.8 18.2* 18.7*   < > 24.5   BUN mg/dL 43* 63* 81*   < > 86*   CREATININE mg/dL 2.45* 3.71* 5.03*   < > 5.81*   CALCIUM mg/dL 8.6 8.1* 8.4*   < > 8.9   BILIRUBIN mg/dL  --   --   --   --  0.4   ALK PHOS U/L  --   --   --   --  76   ALT (SGPT) U/L  --   --   --   --  25   AST (SGOT) U/L  --   --   --   --  24   GLUCOSE mg/dL 94 107* 119*   < > 111*    < > =  values in this interval not displayed.     Results from last 7 days   Lab Units 10/11/21  0625 10/10/21  0619 10/09/21  0925 10/08/21  1540 10/08/21  1540   WBC 10*3/mm3 7.84 7.98 7.92   < > 9.33   HEMOGLOBIN g/dL 10.5* 10.3* 10.9*   < > 11.5*   HEMATOCRIT % 32.3* 30.8* 31.6*   < > 34.3   PLATELETS 10*3/mm3 182 177  --   --  152    < > = values in this interval not displayed.                             Results from last 7 days   Lab Units 10/08/21  1541   URINECX  No growth       Pertinent Radiology Results:    Imaging Results (All)     Procedure Component Value Units Date/Time    XR Chest 1 View [[id number]] Collected: 10/10/21 2339     Updated: 10/11/21 0041    Narrative:      FINAL REPORT    TECHNIQUE:  An AP portable view of the chest was obtained.    CLINICAL HISTORY:  cough/congestion    FINDINGS:  There are bibasilar lung opacities, most consistent with  atelectasis.  The mid and upper lung fields are clear.  There is  no pleural disease or acute osseous abnormality.      Impression:      Bibasilar lung opacities most consistent with atelectasis.    Authenticated by Clifton Roblero M.D. on 10/10/2021 11:39:13 PM    CT Abdomen Pelvis Without Contrast [[id number]] Collected: 10/08/21 1720     Updated: 10/08/21 1721    Narrative:      FINAL REPORT    CLINICAL HISTORY:  Flank pain, kidney stone suspected    FINDINGS:  Technique: Axial images through the abdomen and pelvis were  performed by computed tomography. This study was performed with  techniques to keep radiation doses as low as reasonably  achievable (ALARA). Individualized dose reduction techniques  using automated exposure control or adjustment of mA and/or kV  according to the patient's size were employed.  Abdomen: There  is moderate bilateral lower lobe atelectasis.  The liver is  normal in size and attenuation.  The gallbladder is present.  The spleen is unremarkable.  The pancreas is normal.  The  adrenals are normal.  The aorta is normal in caliber.   There is  mild right hydronephrosis without obstructing stone, could be  due to recently passed stone or infection.  There is no  nephrolithiasis.  No bowel obstruction is identified.  Pelvis:  The appendix is normal.  No distal ureteral stone is identified.  The patient is status post hysterectomy.  The urinary bladder  is unremarkable. There is no free fluid or adenopathy.      Impression:      Mild right hydronephrosis without obstructing stone identified.  Differential considerations include urinary tract infection or  recently passed stone.    Authenticated by SIMONA Lee MD on 10/08/2021 05:20:52 PM          Echo:      Condition on Discharge:      Stable.    Code status during the hospital stay:    Code Status and Medical Interventions:   Ordered at: 10/08/21 1919     Level Of Support Discussed With:    Patient     Code Status:    CPR     Medical Interventions (Level of Support Prior to Arrest):    Full     Discharge Disposition:    Home or Self Care    Discharge Medications:       Discharge Medications      New Medications      Instructions Start Date   levoFLOXacin 500 MG tablet  Commonly known as: Levaquin   500 mg, Oral, Every Other Day         Changes to Medications      Instructions Start Date   lisinopril 10 MG tablet  Commonly known as: PRINIVIL,ZESTRIL  What changed: additional instructions   10 mg, Oral, Daily, Resume on Friday      SITagliptin 25 MG tablet  Commonly known as: JANUVIA  What changed:   · medication strength  · how much to take  · additional instructions  · These instructions start on October 15, 2021. If you are unsure what to do until then, ask your doctor or other care provider.   25 mg, Oral, Daily, Starts on Friday   Start Date: October 15, 2021        Continue These Medications      Instructions Start Date   busPIRone 15 MG tablet  Commonly known as: BUSPAR   15 mg, Oral, Daily      cetirizine 10 MG tablet  Commonly known as: zyrTEC   10 mg, Oral, 2 Times Daily       clobetasol 0.05 % ointment  Commonly known as: TEMOVATE   Topical, As Needed      estradiol 0.075 MG/24HR patch  Commonly known as: MINIVELLE, VIVELLE-DOT   1 patch, Transdermal, 2 Times Weekly      famotidine 40 MG tablet  Commonly known as: PEPCID   40 mg, Oral, Daily      fenofibrate 54 MG tablet  Commonly known as: TRICOR   54 mg, Oral, Daily      FLUoxetine 20 MG capsule  Commonly known as: PROzac   60 mg, Oral, Daily      fluticasone 50 MCG/ACT nasal spray  Commonly known as: FLONASE   2 sprays, Nasal, Daily      levothyroxine 75 MCG tablet  Commonly known as: SYNTHROID, LEVOTHROID   75 mcg, Oral, Daily      loperamide 2 MG capsule  Commonly known as: IMODIUM   2 mg, Oral, 4 Times Daily PRN      montelukast 10 MG tablet  Commonly known as: SINGULAIR   10 mg, Oral, Nightly      ondansetron ODT 4 MG disintegrating tablet  Commonly known as: ZOFRAN-ODT   4 mg, Translingual, Every 8 Hours PRN      oxyCODONE 10 MG tablet  Commonly known as: ROXICODONE   10 mg, Oral, 3 Times Daily      pravastatin 40 MG tablet  Commonly known as: PRAVACHOL   80 mg, Oral, Daily      pregabalin 100 MG capsule  Commonly known as: LYRICA   100 mg, Oral, 2 Times Daily      RABEprazole 20 MG EC tablet  Commonly known as: ACIPHEX   20 mg, Oral, 2 Times Daily      tamsulosin 0.4 MG capsule 24 hr capsule  Commonly known as: FLOMAX   1 capsule, Oral, Daily      valACYclovir 1000 MG tablet  Commonly known as: VALTREX   1,000 mg, Oral, 2 Times Daily PRN      venlafaxine  MG 24 hr capsule  Commonly known as: EFFEXOR-XR   150 mg, Oral, Daily      vitamin B-1 50 MG tablet   50 mg, Oral, Daily      vitamin D3 125 MCG (5000 UT) capsule capsule   5,000 Units, Oral, Daily         Stop These Medications    atorvastatin 40 MG tablet  Commonly known as: LIPITOR     cefdinir 300 MG capsule  Commonly known as: OMNICEF     gabapentin 300 MG capsule  Commonly known as: NEURONTIN     ketorolac 10 MG tablet  Commonly known as: TORADOL     metFORMIN 500  MG tablet  Commonly known as: GLUCOPHAGE     predniSONE 20 MG tablet  Commonly known as: DELTASONE          Discharge Diet:     Diet Instructions    Consistent carb diet          Activity at Discharge:     Activity Instructions    As tolerated          Follow-up Appointments:     Follow-up Information     Stephanie Kulkarni MD Follow up on 10/13/2021.    Specialty: Internal Medicine  Why: @ 8:40 am  Contact information:  [address] [facility] [address]  [phone]                       No future appointments.  Test Results Pending at Discharge:           Jeanette Boyer DO  10/12/21  08:07 EDT    Time: I spent 35 minutes on this discharge activity which included: face-to-face encounter with the patient, reviewing the data in the system, coordination of the care with the nursing staff as well as consultants, documentation, and entering orders.     Dictated utilizing Dragon dictation.

## 2021-10-14 ENCOUNTER — READMISSION MANAGEMENT (OUTPATIENT)
Dept: CALL CENTER | Facility: HOSPITAL | Age: 61
End: 2021-10-14

## 2021-10-14 NOTE — OUTREACH NOTE
Medical Week 1 Survey      Responses   [facility] patient discharged from? [facility]   Does the patient have one of the following disease processes/diagnoses(primary or secondary)? Other   Week 1 attempt successful? Yes   Call start time 1701   Call end time 1704   Discharge diagnosis Pyelonephritis   Meds reviewed with patient/caregiver? Yes   Is the patient having any side effects they believe may be caused by any medication additions or changes? No   Does the patient have all medications ordered at discharge? Yes   Is the patient taking all medications as directed (includes completed medication regime)? Yes   Does the patient have a primary care provider?  Yes   Does the patient have an appointment with their PCP within 7 days of discharge? No   Comments regarding PCP Stephanie Kulkarni MD   What is preventing the patient from scheduling follow up appointments within 7 days of discharge? Haven't had time   Nursing Interventions Educated patient on importance of making appointment,  Advised patient to make appointment   Has the patient kept scheduled appointments due by today? N/A   Has home health visited the patient within 72 hours of discharge? N/A   Psychosocial issues? No   Did the patient receive a copy of their discharge instructions? Yes   Nursing interventions Reviewed instructions with patient   What is the patient's perception of their health status since discharge? Improving   Is the patient/caregiver able to teach back signs and symptoms related to disease process for when to call PCP? Yes   Is the patient/caregiver able to teach back signs and symptoms related to disease process for when to call 911? Yes   Is the patient/caregiver able to teach back the hierarchy of who to call/visit for symptoms/problems? PCP, Specialist, Home health nurse, Urgent Care, ED, 911 Yes   If the patient is a current smoker, are they able to teach back resources for cessation? Not a smoker   Week 1 call completed?  Yes          El Sanchez RN

## 2021-10-20 ENCOUNTER — READMISSION MANAGEMENT (OUTPATIENT)
Dept: CALL CENTER | Facility: HOSPITAL | Age: 61
End: 2021-10-20

## 2021-10-20 NOTE — OUTREACH NOTE
Medical Week 2 Survey      Responses   [facility] patient discharged from? [facility]   Does the patient have one of the following disease processes/diagnoses(primary or secondary)? Other   Week 2 attempt successful? No   Unsuccessful attempts Attempt 1  [ATTEMPTED PATIENT'S AND SPOUSE'S PHONES WITH NO ANSWER]          Nicole Fields LPN

## 2021-10-21 ENCOUNTER — READMISSION MANAGEMENT (OUTPATIENT)
Dept: CALL CENTER | Facility: HOSPITAL | Age: 61
End: 2021-10-21

## 2021-10-21 NOTE — OUTREACH NOTE
Medical Week 2 Survey      Responses   [facility] patient discharged from? [facility]   Does the patient have one of the following disease processes/diagnoses(primary or secondary)? Other   Week 2 attempt successful? No   Unsuccessful attempts Attempt 2          Carol De Leon RN

## 2021-10-28 ENCOUNTER — READMISSION MANAGEMENT (OUTPATIENT)
Dept: CALL CENTER | Facility: HOSPITAL | Age: 61
End: 2021-10-28

## 2021-10-28 NOTE — OUTREACH NOTE
Medical Week 3 Survey      Responses   [facility] patient discharged from? [facility]   Does the patient have one of the following disease processes/diagnoses(primary or secondary)? Other   Week 3 attempt successful? Yes   Call start time 1605   Call end time 1606   Discharge diagnosis Pyelonephritis   Is the patient taking all medications as directed (includes completed medication regime)? Yes   Has the patient kept scheduled appointments due by today? Yes   Has home health visited the patient within 72 hours of discharge? N/A   Psychosocial issues? No   What is the patient's perception of their health status since discharge? Improving   Is the patient/caregiver able to teach back the hierarchy of who to call/visit for symptoms/problems? PCP, Specialist, Home health nurse, Urgent Care, ED, 911 Yes   Week 3 Call Completed? Yes   Graduated Yes   Is the patient interested in additional calls from an ambulatory ?  NOTE:  applies to high risk patients requiring additional follow-up. No   Did the patient feel the follow up calls were helpful during their recovery period? Yes   Was the number of calls appropriate? Yes   Graduated/Revoked comments Doing well, no further calls needed.          Tram Avila RN

## 2022-10-31 ENCOUNTER — TRANSCRIBE ORDERS (OUTPATIENT)
Dept: LAB | Facility: HOSPITAL | Age: 62
End: 2022-10-31

## 2022-10-31 ENCOUNTER — LAB (OUTPATIENT)
Dept: LAB | Facility: HOSPITAL | Age: 62
End: 2022-10-31

## 2022-10-31 DIAGNOSIS — D50.9 IRON DEFICIENCY ANEMIA, UNSPECIFIED IRON DEFICIENCY ANEMIA TYPE: Primary | ICD-10-CM

## 2022-10-31 DIAGNOSIS — D50.9 IRON DEFICIENCY ANEMIA, UNSPECIFIED IRON DEFICIENCY ANEMIA TYPE: ICD-10-CM

## 2022-10-31 LAB
ALBUMIN SERPL-MCNC: 4.3 G/DL (ref 3.5–5.2)
ALBUMIN/GLOB SERPL: 1.3 G/DL
ALP SERPL-CCNC: 73 U/L (ref 39–117)
ALT SERPL W P-5'-P-CCNC: 9 U/L (ref 1–33)
ANION GAP SERPL CALCULATED.3IONS-SCNC: 8.2 MMOL/L (ref 5–15)
AST SERPL-CCNC: 12 U/L (ref 1–32)
BASOPHILS # BLD AUTO: 0.04 10*3/MM3 (ref 0–0.2)
BASOPHILS NFR BLD AUTO: 0.3 % (ref 0–1.5)
BILIRUB SERPL-MCNC: 0.5 MG/DL (ref 0–1.2)
BUN SERPL-MCNC: 13 MG/DL (ref 8–23)
BUN/CREAT SERPL: 14.1 (ref 7–25)
CALCIUM SPEC-SCNC: 9.7 MG/DL (ref 8.6–10.5)
CHLORIDE SERPL-SCNC: 100 MMOL/L (ref 98–107)
CO2 SERPL-SCNC: 32.8 MMOL/L (ref 22–29)
CREAT SERPL-MCNC: 0.92 MG/DL (ref 0.57–1)
DEPRECATED RDW RBC AUTO: 44.8 FL (ref 37–54)
EGFRCR SERPLBLD CKD-EPI 2021: 70.1 ML/MIN/1.73
EOSINOPHIL # BLD AUTO: 0.1 10*3/MM3 (ref 0–0.4)
EOSINOPHIL NFR BLD AUTO: 0.9 % (ref 0.3–6.2)
ERYTHROCYTE [DISTWIDTH] IN BLOOD BY AUTOMATED COUNT: 13.4 % (ref 12.3–15.4)
FERRITIN SERPL-MCNC: 65.67 NG/ML (ref 13–150)
GLOBULIN UR ELPH-MCNC: 3.2 GM/DL
GLUCOSE SERPL-MCNC: 145 MG/DL (ref 65–99)
HCT VFR BLD AUTO: 35.5 % (ref 34–46.6)
HGB BLD-MCNC: 12 G/DL (ref 12–15.9)
IMM GRANULOCYTES # BLD AUTO: 0.07 10*3/MM3 (ref 0–0.05)
IMM GRANULOCYTES NFR BLD AUTO: 0.6 % (ref 0–0.5)
IRON 24H UR-MRATE: 82 MCG/DL (ref 37–145)
IRON SATN MFR SERPL: 24 % (ref 20–50)
LYMPHOCYTES # BLD AUTO: 2.4 10*3/MM3 (ref 0.7–3.1)
LYMPHOCYTES NFR BLD AUTO: 20.6 % (ref 19.6–45.3)
MCH RBC QN AUTO: 31.1 PG (ref 26.6–33)
MCHC RBC AUTO-ENTMCNC: 33.8 G/DL (ref 31.5–35.7)
MCV RBC AUTO: 92 FL (ref 79–97)
MONOCYTES # BLD AUTO: 0.62 10*3/MM3 (ref 0.1–0.9)
MONOCYTES NFR BLD AUTO: 5.3 % (ref 5–12)
NEUTROPHILS NFR BLD AUTO: 72.3 % (ref 42.7–76)
NEUTROPHILS NFR BLD AUTO: 8.4 10*3/MM3 (ref 1.7–7)
NRBC BLD AUTO-RTO: 0 /100 WBC (ref 0–0.2)
PLATELET # BLD AUTO: 261 10*3/MM3 (ref 140–450)
PMV BLD AUTO: 9.2 FL (ref 6–12)
POTASSIUM SERPL-SCNC: 4.7 MMOL/L (ref 3.5–5.2)
PROT SERPL-MCNC: 7.5 G/DL (ref 6–8.5)
RBC # BLD AUTO: 3.86 10*6/MM3 (ref 3.77–5.28)
RETICS # AUTO: 0.08 10*6/MM3 (ref 0.02–0.13)
RETICS/RBC NFR AUTO: 2.17 % (ref 0.7–1.9)
SODIUM SERPL-SCNC: 141 MMOL/L (ref 136–145)
TIBC SERPL-MCNC: 349 MCG/DL (ref 298–536)
TRANSFERRIN SERPL-MCNC: 234 MG/DL (ref 200–360)
WBC NRBC COR # BLD: 11.63 10*3/MM3 (ref 3.4–10.8)

## 2022-10-31 PROCEDURE — 36415 COLL VENOUS BLD VENIPUNCTURE: CPT

## 2022-10-31 PROCEDURE — 82746 ASSAY OF FOLIC ACID SERUM: CPT

## 2022-10-31 PROCEDURE — 82607 VITAMIN B-12: CPT

## 2022-10-31 PROCEDURE — 83540 ASSAY OF IRON: CPT

## 2022-10-31 PROCEDURE — 85045 AUTOMATED RETICULOCYTE COUNT: CPT

## 2022-10-31 PROCEDURE — 84466 ASSAY OF TRANSFERRIN: CPT

## 2022-10-31 PROCEDURE — 85025 COMPLETE CBC W/AUTO DIFF WBC: CPT

## 2022-10-31 PROCEDURE — 82728 ASSAY OF FERRITIN: CPT

## 2022-10-31 PROCEDURE — 80053 COMPREHEN METABOLIC PANEL: CPT

## 2022-11-01 LAB
FOLATE SERPL-MCNC: 11.2 NG/ML (ref 4.78–24.2)
VIT B12 BLD-MCNC: 554 PG/ML (ref 211–946)

## 2024-07-17 ENCOUNTER — OFFICE VISIT (OUTPATIENT)
Dept: OBSTETRICS AND GYNECOLOGY | Facility: CLINIC | Age: 64
End: 2024-07-17
Payer: MEDICARE

## 2024-07-17 VITALS
HEIGHT: 65 IN | WEIGHT: 172 LBS | DIASTOLIC BLOOD PRESSURE: 62 MMHG | BODY MASS INDEX: 28.66 KG/M2 | SYSTOLIC BLOOD PRESSURE: 128 MMHG

## 2024-07-17 DIAGNOSIS — N81.6 RECTOCELE: ICD-10-CM

## 2024-07-17 DIAGNOSIS — N95.2 POSTMENOPAUSAL ATROPHIC VAGINITIS: ICD-10-CM

## 2024-07-17 DIAGNOSIS — R39.15 URINARY URGENCY: ICD-10-CM

## 2024-07-17 DIAGNOSIS — K59.00 CONSTIPATION, UNSPECIFIED CONSTIPATION TYPE: ICD-10-CM

## 2024-07-17 DIAGNOSIS — R39.9 URINARY TRACT INFECTION SYMPTOMS: ICD-10-CM

## 2024-07-17 DIAGNOSIS — R32 URINARY INCONTINENCE, UNSPECIFIED TYPE: ICD-10-CM

## 2024-07-17 DIAGNOSIS — R15.9 INCONTINENCE OF FECES, UNSPECIFIED FECAL INCONTINENCE TYPE: ICD-10-CM

## 2024-07-17 DIAGNOSIS — Z87.448 HISTORY OF HYDRONEPHROSIS: ICD-10-CM

## 2024-07-17 DIAGNOSIS — N81.11 CYSTOCELE, MIDLINE: ICD-10-CM

## 2024-07-17 DIAGNOSIS — R39.198 DIFFICULTY VOIDING: ICD-10-CM

## 2024-07-17 DIAGNOSIS — R10.2 PELVIC PRESSURE IN FEMALE: Primary | ICD-10-CM

## 2024-07-17 PROCEDURE — 1160F RVW MEDS BY RX/DR IN RCRD: CPT | Performed by: OBSTETRICS & GYNECOLOGY

## 2024-07-17 PROCEDURE — 1159F MED LIST DOCD IN RCRD: CPT | Performed by: OBSTETRICS & GYNECOLOGY

## 2024-07-17 PROCEDURE — 99459 PELVIC EXAMINATION: CPT | Performed by: OBSTETRICS & GYNECOLOGY

## 2024-07-17 PROCEDURE — 99204 OFFICE O/P NEW MOD 45 MIN: CPT | Performed by: OBSTETRICS & GYNECOLOGY

## 2024-07-17 RX ORDER — METFORMIN HYDROCHLORIDE 500 MG/1
TABLET, EXTENDED RELEASE ORAL
COMMUNITY
Start: 2024-06-12

## 2024-07-17 RX ORDER — SULFAMETHOXAZOLE AND TRIMETHOPRIM 800; 160 MG/1; MG/1
1 TABLET ORAL 2 TIMES DAILY
Qty: 14 TABLET | Refills: 0 | Status: SHIPPED | OUTPATIENT
Start: 2024-07-17 | End: 2024-07-24

## 2024-07-17 RX ORDER — ESTRADIOL 0.1 MG/G
CREAM VAGINAL
Qty: 1 EACH | Refills: 11 | Status: SHIPPED | OUTPATIENT
Start: 2024-07-17

## 2024-07-17 RX ORDER — FAMOTIDINE 40 MG/1
TABLET, FILM COATED ORAL
COMMUNITY
Start: 2024-07-06

## 2024-07-17 RX ORDER — FENOFIBRATE 160 MG/1
TABLET ORAL
COMMUNITY
Start: 2024-06-17

## 2024-07-17 RX ORDER — ALLOPURINOL 300 MG/1
TABLET ORAL
COMMUNITY
Start: 2024-06-13

## 2024-07-17 RX ORDER — FLUOXETINE HYDROCHLORIDE 20 MG/1
CAPSULE ORAL
COMMUNITY
Start: 2024-06-26

## 2024-07-17 RX ORDER — MODAFINIL 200 MG/1
200 TABLET ORAL DAILY
COMMUNITY
Start: 2024-02-20 | End: 2024-10-28

## 2024-07-18 NOTE — PROGRESS NOTES
Chief Complaint  Vaginal Prolapse (Patient complains of urinary urgency, difficulty emptying bladder and incontinence. Patient advised symptoms worse over past year. )     History of Present Illness:  Patient is 64 y.o.  who presents to [facility] here as a new patient for evaluation of pelvic pressure and bulge per vagina.  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  She had done well for several years.  She reports over the last year having pressure and bulge per vagina.  Patient reports in  she was hospitalized secondary to pyelonephritis.  She had a questionable kidney stone.  She was noted to have hydronephrosis.  She does report having urinary incontinence.  She has both urgency as well as leaking at rest.  She does have to wear a pad.  She reports having difficulty however emptying her bladder completely.  She will have to change positions.  She has had recent worsening of her urinary incontinence and difficulty voiding as well.  She also has a history of colitis.  She had a colonoscopy 3 to 4 years ago.  She will have alternating diarrhea and constipation.  She reports when her stools are loose she will have fecal incontinence as well.  She sees Dr. Kulkarni for her primary care.    History  Past Medical History:   Diagnosis Date    Anemia     Anxiety     B12 deficiency     Sandra esophagus     Cervical dysplasia     Have had hysterectomy    Colitis     DDD (degenerative disc disease), cervical     Depression     Severe depression then, mild depression comes and goes    Diabetes mellitus     Disease of thyroid gland     Gout     Hyperlipidemia     Hypertension     Kidney disease, chronic, stage III (moderate, EGFR 30-59 ml/min)     Psoriatic arthritis     Sleep apnea     Ulcerative colitis     Urinary tract infection Oct. 2021    Ended up with severe kidney infection    Varicella     As a child     Current Outpatient  Medications on File Prior to Visit   Medication Sig Dispense Refill    allopurinol (ZYLOPRIM) 300 MG tablet       famotidine (PEPCID) 40 MG tablet       fenofibrate 160 MG tablet       FLUoxetine (PROzac) 20 MG capsule       metFORMIN ER (GLUCOPHAGE-XR) 500 MG 24 hr tablet       modafinil (PROVIGIL) 200 MG tablet Take 1 tablet by mouth Daily.      B Complex Vitamins (VITAMIN B COMPLEX 100 IJ) vitamin B complex      busPIRone (BUSPAR) 15 MG tablet Every 12 (Twelve) Hours.      cetirizine (ZyrTEC Allergy) 10 MG tablet Zyrtec 10 mg tablet   Take 1 tablet every day by oral route.      clobetasol (TEMOVATE) 0.05 % ointment Apply  topically As Needed.      cyclobenzaprine (FLEXERIL) 10 MG tablet       diphenhydrAMINE (Benadryl Allergy) 25 mg capsule Every 4 (Four) Hours.      estradiol (MINIVELLE, VIVELLE-DOT) 0.075 MG/24HR patch estradiol 0.075 mg/24 hr semiweekly transdermal patch   Apply 1 patch twice a week by transdermal route.      fluticasone (FLONASE) 50 MCG/ACT nasal spray fluticasone propionate 50 mcg/actuation nasal spray,suspension   Spray 1 spray every day by intranasal route.      gabapentin (NEURONTIN) 300 MG capsule Every 12 (Twelve) Hours.      HYDROcodone-acetaminophen (NORCO) 7.5-325 MG per tablet Take 1 tablet by mouth.      levothyroxine (SYNTHROID, LEVOTHROID) 75 MCG tablet levothyroxine 75 mcg tablet   Take 1 tablet every day by oral route.      lisinopril (PRINIVIL,ZESTRIL) 10 MG tablet lisinopril 10 mg tablet   Take 1 tablet every day by oral route.      loperamide (IMODIUM) 2 MG capsule Take 2 mg by mouth 4 (Four) Times a Day As Needed for Diarrhea.      montelukast (SINGULAIR) 10 MG tablet Take 1 tablet by mouth Daily.      naloxone (NARCAN) 4 MG/0.1ML nasal spray naloxone 4 mg/actuation nasal spray   ADMINISTER A SINGLE SPRAY IN ONE NOSTRIL UPON SIGNS OF OPIOID OVERDOSE. CALL 911. REPEAT AFTER 3 MINUTES IF NO RESPONSE IN ALTERNATING NOSTRIL      pravastatin (PRAVACHOL) 80 MG tablet        "RABEprazole (ACIPHEX) 20 MG EC tablet Take 20 mg by mouth 2 (Two) Times a Day.      SITagliptin (JANUVIA) 25 MG tablet Take 1 tablet by mouth Daily for 30 days. Starts on Friday 30 tablet 0    valACYclovir (Valtrex) 1000 MG tablet Every 12 (Twelve) Hours.       No current facility-administered medications on file prior to visit.     Allergies   Allergen Reactions    Oxycodone Hcl Rash     Past Surgical History:   Procedure Laterality Date    CERVICAL DISCECTOMY ANTERIOR      HYSTERECTOMY      LAPAROSCOPIC ASSISTED VAGINAL HYSTERECTOMY SALPINGO OOPHORECTOMY  2010    SINUS SURGERY      TONSILLECTOMY      VAGINAL PROLAPSE REPAIR  2010     Family History   Problem Relation Age of Onset    Melanoma Mother     Coronary artery disease Mother     Stroke Mother     Osteoporosis Mother     Coronary artery disease Father     Breast cancer Maternal Aunt     Breast cancer Paternal Aunt     Pulmonary embolism Sister     Coronary artery disease Sister     Hypertension Sister     Coronary artery disease Brother     Kidney disease Neg Hx      Social History     Socioeconomic History    Marital status:    Tobacco Use    Smoking status: Never    Smokeless tobacco: Never   Vaping Use    Vaping status: Never Used   Substance and Sexual Activity    Alcohol use: Not Currently     Comment: Haven't had a drink in many years.    Drug use: Never    Sexual activity: Not Currently     Partners: Male     Birth control/protection: Hysterectomy       Physical Examination:  Vital Signs: /62   Ht 165.1 cm (65\")   Wt 78 kg (172 lb)   BMI 28.62 kg/m²     General Appearance: alert, appears stated age, and cooperative  Breasts: Not performed  Abdomen: no masses, no hepatomegaly, no splenomegaly, soft non-tender, no guarding, and no rebound tenderness  Pelvic: Clinical staff was present for exam; pelvic examination was required for evaluation  External genitalia:  normal appearance of the external genitalia including Bartholin's and " Pabellones's glands.  :  urethral meatus normal;  Vaginal:  atrophic mucosal changes are present;  Cervix:  absent.  Uterus:  absent.  Adnexa:  non palpable bilaterally.  Cystocele grade 1  Rectocele grade 1    Data Review:  The following data was reviewed by: Amelia Ritchie MD on 07/17/2024:     Labs:  HEMOGLOBIN A1C (03/27/2024 08:07)  FERRITIN (06/13/2024 11:52)  IRON PROFILE (06/13/2024 11:52)  MAGNESIUM (06/13/2024 11:52)  LIPID PANEL W/ CHOL/HDL RATIO (06/13/2024 11:52)  COMPREHENSIVE METABOLIC PANEL (06/13/2024 11:52)  URIC ACID (06/13/2024 11:52)  Vitamin B12/Folate Panel (06/13/2024 11:52)  VITAMIN D,25-HYDROXY (06/13/2024 11:52)  T4, FREE (06/13/2024 11:52)  TSH (06/13/2024 11:52)  HEMOGLOBIN A1C (06/13/2024 11:52)  CBC AND DIFFERENTIAL (06/13/2024 11:52)  Imaging:  Mammo Screening Digital Tomosynthesis Bilateral With CAD (09/05/2023 11:10)   Medical Records:  Discharge Summary by Jeanette Boyer DO (10/11/2021 13:20)     Assessment and Plan   1. Urinary tract infection symptoms  Urine for clean-catch UA culture and sensitivity.  Prescription is given for Bactrim as noted.  Patient is to call for culture results.  - Urine Culture - Urine, Urine, Clean Catch  - Urinalysis With Microscopic - Urine, Clean Catch  - estradiol (ESTRACE VAGINAL) 0.1 MG/GM vaginal cream; Use 0.5 grams intravaginally twice weekly to control symptoms.  Dispense: 1 each; Refill: 11  - sulfamethoxazole-trimethoprim (Bactrim DS) 800-160 MG per tablet; Take 1 tablet by mouth 2 (Two) Times a Day for 7 days.  Dispense: 14 tablet; Refill: 0  - US Renal Bilateral; Future    2. Cystocele, midline  Pt with pelvic organ prolapse.  Risks factors discussed including increasing parity, advancing age, obesity, history of hysterectomy, and chronic constipation.  Signs and symptoms discussed.  Treatment options discussed as well including expectant management, pelvic floor muscle exercises, pessary, medication, and surgery.  Changes in lifestyle  discussed including no heavy lifting or straining, keep stools soft, and avoid increasing pressure in the area of prolapse.  Pelvic floor exercises were also discussed and reviewed.  Benefits and complications of wearing a pessary was discussed.  Estrogen therapy to strengthen the supporting structures and possibly ameliorate symptoms was discussed.  Surgical intervention with risks, complications, and benefits was also discussed.   - estradiol (ESTRACE VAGINAL) 0.1 MG/GM vaginal cream; Use 0.5 grams intravaginally twice weekly to control symptoms.  Dispense: 1 each; Refill: 11    3. Constipation, unspecified constipation type  I stressed to the patient the need to keep stools soft when she has her episodes of constipation.  Instructions and precautions have been given.  Patient is designed to obtain a copy of her recent colonoscopy.    4. Postmenopausal atrophic vaginitis  Discussed various options for relief of atrophic vaginal symptoms related to menopause. Discussed local therapy for treatment of vaginal symptoms only.  Discussed the different formulation options including cream, ring, and tablets.  Discussed the low risk of systemic absorption in postmenopausal women with atrophy using 25 mcgs of estradiol on a daily basis.  Recommend low dose use 2-3x/wk for maintenance of treatment.  Other treatment options were discussed including the use of water-based and silicone-based vaginal lubricants and moisturizers.  Also discussed was the FDA approved treatment option of ospemifene for moderate to severe dyspareunia.   - estradiol (ESTRACE VAGINAL) 0.1 MG/GM vaginal cream; Use 0.5 grams intravaginally twice weekly to control symptoms.  Dispense: 1 each; Refill: 11    5. Rectocele  I discussed with the patient the various options for management of her symptoms.  Prescription is given for estrogen cream.  Patient is to follow-up as discussed.  - estradiol (ESTRACE VAGINAL) 0.1 MG/GM vaginal cream; Use 0.5 grams  intravaginally twice weekly to control symptoms.  Dispense: 1 each; Refill: 11    6. Difficulty voiding  Prescription is given for Estrace.  Patient is to apply to the periurethral area as discussed.  Renal ultrasound as noted.  - estradiol (ESTRACE VAGINAL) 0.1 MG/GM vaginal cream; Use 0.5 grams intravaginally twice weekly to control symptoms.  Dispense: 1 each; Refill: 11  - sulfamethoxazole-trimethoprim (Bactrim DS) 800-160 MG per tablet; Take 1 tablet by mouth 2 (Two) Times a Day for 7 days.  Dispense: 14 tablet; Refill: 0  -  Renal Bilateral; Future    7. Urinary urgency  Prescription given for Estrace as noted.  Instructions and precautions have been given.  - estradiol (ESTRACE VAGINAL) 0.1 MG/GM vaginal cream; Use 0.5 grams intravaginally twice weekly to control symptoms.  Dispense: 1 each; Refill: 11  - sulfamethoxazole-trimethoprim (Bactrim DS) 800-160 MG per tablet; Take 1 tablet by mouth 2 (Two) Times a Day for 7 days.  Dispense: 14 tablet; Refill: 0    8. Urinary incontinence, unspecified type  Estrella Recio was informed that urinary incontinence is the involuntary leaking of urine caused by a wide variety of factors.  It is a common condition in women that increases with age.  The patient was informed that there are different types of urinary incontinence to include stress urinary incontinence, urgency urinary incontinence, and mixed urinary incontinence as well as other subtypes.  The patient was informed that the correct diagnosis is important in the evaluation and treatment of her leaking.  The basic evaluation includes patient's history and physical examination.  A urinary tract infection needs to be ruled out as well as urinary retention and overflow incontinence.  Sometimes additional specialized testing needs to be performed such as urodynamic testing and cystoscopy.  The various treatment options were discussed ranging from conservative  treatment to include pelvic floor exercises and  modifications in lifestyle, pessary, pharmacologic, to surgical.  Locally administered estrogen may be of some benefit in women with vaginal atrophy. Because treatment options vary by incontinence type and effectiveness, it is important to determine the etiology and severity of symptoms.   - estradiol (ESTRACE VAGINAL) 0.1 MG/GM vaginal cream; Use 0.5 grams intravaginally twice weekly to control symptoms.  Dispense: 1 each; Refill: 11  - sulfamethoxazole-trimethoprim (Bactrim DS) 800-160 MG per tablet; Take 1 tablet by mouth 2 (Two) Times a Day for 7 days.  Dispense: 14 tablet; Refill: 0    9. Incontinence of feces, unspecified fecal incontinence type  Patient is designed to obtain a copy of her colonoscopy as noted.  Instructions and precautions have been given.  Patient is to follow-up as discussed.    10. History of hydronephrosis  Will schedule renal ultrasound as noted.  - US Renal Bilateral; Future    11. Pelvic pressure in female  Discussed with the patient the findings on examination as noted.  Instructions and precautions have been given.  Patient is to follow-up as discussed.    Follow Up/Instructions:  Follow up as noted.  Patient was given instructions and counseling regarding her condition or for health maintenance advice. Please see specific information pulled into the AVS if appropriate.     Note: Speech recognition transcription software may have been used to dictate portions of this document.  An attempt at proofreading has been made though minor errors in transcription may still be present.    This note was electronically signed.  Amelia Ritchie M.D.

## 2024-07-21 LAB
APPEARANCE UR: CLEAR
BACTERIA #/AREA URNS HPF: ABNORMAL /HPF
BACTERIA UR CULT: ABNORMAL
BACTERIA UR CULT: ABNORMAL
BILIRUB UR QL STRIP: NEGATIVE
CASTS URNS MICRO: ABNORMAL
COLOR UR: YELLOW
EPI CELLS #/AREA URNS HPF: ABNORMAL /HPF
GLUCOSE UR QL STRIP: NEGATIVE
HGB UR QL STRIP: NEGATIVE
KETONES UR QL STRIP: NEGATIVE
LEUKOCYTE ESTERASE UR QL STRIP: ABNORMAL
NITRITE UR QL STRIP: NEGATIVE
OTHER ANTIBIOTIC SUSC ISLT: ABNORMAL
PH UR STRIP: 6 [PH] (ref 5–8)
PROT UR QL STRIP: NEGATIVE
RBC #/AREA URNS HPF: ABNORMAL /HPF
SP GR UR STRIP: 1.01 (ref 1–1.03)
UROBILINOGEN UR STRIP-MCNC: ABNORMAL MG/DL
WBC #/AREA URNS HPF: ABNORMAL /HPF

## 2024-08-15 ENCOUNTER — HOSPITAL ENCOUNTER (OUTPATIENT)
Dept: ULTRASOUND IMAGING | Facility: HOSPITAL | Age: 64
Discharge: HOME | End: 2024-08-15
Admitting: OBSTETRICS & GYNECOLOGY
Payer: MEDICARE

## 2024-08-15 DIAGNOSIS — R39.9 URINARY TRACT INFECTION SYMPTOMS: ICD-10-CM

## 2024-08-15 DIAGNOSIS — Z87.448 HISTORY OF HYDRONEPHROSIS: ICD-10-CM

## 2024-08-15 DIAGNOSIS — R39.198 DIFFICULTY VOIDING: ICD-10-CM

## 2024-08-15 PROCEDURE — 76775 US EXAM ABDO BACK WALL LIM: CPT

## 2024-08-28 ENCOUNTER — OFFICE VISIT (OUTPATIENT)
Dept: OBSTETRICS AND GYNECOLOGY | Facility: CLINIC | Age: 64
End: 2024-08-28
Payer: MEDICARE

## 2024-08-28 VITALS
HEIGHT: 65 IN | WEIGHT: 169 LBS | DIASTOLIC BLOOD PRESSURE: 66 MMHG | SYSTOLIC BLOOD PRESSURE: 128 MMHG | BODY MASS INDEX: 28.16 KG/M2

## 2024-08-28 DIAGNOSIS — N81.11 CYSTOCELE, MIDLINE: ICD-10-CM

## 2024-08-28 DIAGNOSIS — R39.198 DIFFICULTY VOIDING: ICD-10-CM

## 2024-08-28 DIAGNOSIS — K59.00 CONSTIPATION, UNSPECIFIED CONSTIPATION TYPE: ICD-10-CM

## 2024-08-28 DIAGNOSIS — R10.2 PELVIC PRESSURE IN FEMALE: Primary | ICD-10-CM

## 2024-08-28 DIAGNOSIS — R39.15 URINARY URGENCY: ICD-10-CM

## 2024-08-28 DIAGNOSIS — R15.9 INCONTINENCE OF FECES, UNSPECIFIED FECAL INCONTINENCE TYPE: ICD-10-CM

## 2024-08-28 DIAGNOSIS — R32 URINARY INCONTINENCE, UNSPECIFIED TYPE: ICD-10-CM

## 2024-08-28 DIAGNOSIS — N95.2 POSTMENOPAUSAL ATROPHIC VAGINITIS: ICD-10-CM

## 2024-08-28 DIAGNOSIS — N81.6 RECTOCELE: ICD-10-CM

## 2024-08-28 DIAGNOSIS — N26.1 RIGHT RENAL ATROPHY: ICD-10-CM

## 2024-08-28 PROCEDURE — 1160F RVW MEDS BY RX/DR IN RCRD: CPT | Performed by: OBSTETRICS & GYNECOLOGY

## 2024-08-28 PROCEDURE — 99214 OFFICE O/P EST MOD 30 MIN: CPT | Performed by: OBSTETRICS & GYNECOLOGY

## 2024-08-28 PROCEDURE — 1159F MED LIST DOCD IN RCRD: CPT | Performed by: OBSTETRICS & GYNECOLOGY

## 2024-08-28 RX ORDER — PREGABALIN 100 MG/1
CAPSULE ORAL
COMMUNITY

## 2024-08-28 NOTE — PROGRESS NOTES
Chief Complaint  Follow-up (Follow up prolapse and atrophy. Patient advised symptoms have improved. )     History of Present Illness:  Patient is 64 y.o.  who presents to [facility] here for follow-up evaluation of her prolapse as well as vaginal atrophy.  Patient has been using her estrogen cream.  She did have a UTI.  She has completed her Bactrim.  Patient was not aware she had a UTI.  She denies any flank pain, fever, or chills.  She does report having improvement in the urinary incontinence.  [facility] does have [facility]  She had seen them many years ago.  Patient had previously been hospitalized in .  She was treated for a kidney infection.  She reports being near sepsis.  She did have CT scan at that time.  She also had a renal ultrasound many years ago.  Patient continues to have episodes of diarrhea.  She has been diagnosed with colitis.  She was told her studies were inconclusive for Crohn's disease.  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  She does have an EGD and colonoscopy scheduled for October.  She has continued with her estradiol patch.  Patient is also followed by neurology for cognitive issues after her hospitalization.    History  Past Medical History:   Diagnosis Date    Anemia     Anxiety     B12 deficiency     Sandra esophagus     Cervical dysplasia     Have had hysterectomy    Colitis     DDD (degenerative disc disease), cervical     Depression     Severe depression then, mild depression comes and goes    Diabetes mellitus     Disease of thyroid gland     Gout     Hyperlipidemia     Hypertension     Kidney disease, chronic, stage III (moderate, EGFR 30-59 ml/min)     Psoriatic arthritis     Sleep apnea     Ulcerative colitis     Urinary tract infection Oct. 2021    Ended up with severe kidney infection    Varicella     As a child     Current Outpatient Medications on File Prior to Visit   Medication Sig  Dispense Refill    allopurinol (ZYLOPRIM) 300 MG tablet       B Complex Vitamins (VITAMIN B COMPLEX 100 IJ) vitamin B complex      busPIRone (BUSPAR) 15 MG tablet Every 12 (Twelve) Hours.      cetirizine (ZyrTEC Allergy) 10 MG tablet Zyrtec 10 mg tablet   Take 1 tablet every day by oral route.      clobetasol (TEMOVATE) 0.05 % ointment Apply  topically As Needed.      cyclobenzaprine (FLEXERIL) 10 MG tablet       diphenhydrAMINE (Benadryl Allergy) 25 mg capsule Every 4 (Four) Hours.      estradiol (ESTRACE VAGINAL) 0.1 MG/GM vaginal cream Use 0.5 grams intravaginally twice weekly to control symptoms. 1 each 11    estradiol (MINIVELLE, VIVELLE-DOT) 0.075 MG/24HR patch estradiol 0.075 mg/24 hr semiweekly transdermal patch   Apply 1 patch twice a week by transdermal route.      famotidine (PEPCID) 40 MG tablet       fenofibrate 160 MG tablet       FLUoxetine (PROzac) 20 MG capsule       fluticasone (FLONASE) 50 MCG/ACT nasal spray fluticasone propionate 50 mcg/actuation nasal spray,suspension   Spray 1 spray every day by intranasal route.      gabapentin (NEURONTIN) 300 MG capsule Every 12 (Twelve) Hours.      HYDROcodone-acetaminophen (NORCO) 7.5-325 MG per tablet Take 1 tablet by mouth.      levothyroxine (SYNTHROID, LEVOTHROID) 75 MCG tablet levothyroxine 75 mcg tablet   Take 1 tablet every day by oral route.      lisinopril (PRINIVIL,ZESTRIL) 10 MG tablet lisinopril 10 mg tablet   Take 1 tablet every day by oral route.      loperamide (IMODIUM) 2 MG capsule Take 2 mg by mouth 4 (Four) Times a Day As Needed for Diarrhea.      metFORMIN ER (GLUCOPHAGE-XR) 500 MG 24 hr tablet       modafinil (PROVIGIL) 200 MG tablet Take 1 tablet by mouth Daily.      montelukast (SINGULAIR) 10 MG tablet Take 1 tablet by mouth Daily.      naloxone (NARCAN) 4 MG/0.1ML nasal spray naloxone 4 mg/actuation nasal spray   ADMINISTER A SINGLE SPRAY IN ONE NOSTRIL UPON SIGNS OF OPIOID OVERDOSE. CALL 911. REPEAT AFTER 3 MINUTES IF NO RESPONSE  "IN ALTERNATING NOSTRIL      pravastatin (PRAVACHOL) 80 MG tablet       pregabalin (Lyrica) 100 MG capsule Take 1 capsule 3 times a day by oral route.      RABEprazole (ACIPHEX) 20 MG EC tablet Take 20 mg by mouth 2 (Two) Times a Day.      SITagliptin (JANUVIA) 25 MG tablet Take 1 tablet by mouth Daily for 30 days. Starts on Friday 30 tablet 0    valACYclovir (Valtrex) 1000 MG tablet Every 12 (Twelve) Hours.       No current facility-administered medications on file prior to visit.     Allergies   Allergen Reactions    Oxycodone Hcl Rash     Past Surgical History:   Procedure Laterality Date    CERVICAL DISCECTOMY ANTERIOR      HYSTERECTOMY      LAPAROSCOPIC ASSISTED VAGINAL HYSTERECTOMY SALPINGO OOPHORECTOMY  2010    SINUS SURGERY      TONSILLECTOMY      VAGINAL PROLAPSE REPAIR  2010     Family History   Problem Relation Age of Onset    Melanoma Mother     Coronary artery disease Mother     Stroke Mother     Osteoporosis Mother     Coronary artery disease Father     Breast cancer Maternal Aunt     Breast cancer Paternal Aunt     Pulmonary embolism Sister     Coronary artery disease Sister     Hypertension Sister     Coronary artery disease Brother     Kidney disease Neg Hx      Social History     Socioeconomic History    Marital status:    Tobacco Use    Smoking status: Never    Smokeless tobacco: Never   Vaping Use    Vaping status: Never Used   Substance and Sexual Activity    Alcohol use: Not Currently     Comment: Haven't had a drink in many years.    Drug use: Never    Sexual activity: Not Currently     Partners: Male     Birth control/protection: Hysterectomy       Physical Examination:  Vital Signs: /66   Ht 165.1 cm (65\")   Wt 76.7 kg (169 lb)   BMI 28.12 kg/m²     General Appearance: alert, appears stated age, and cooperative  Breasts: Not performed.  Abdomen: no masses, no hepatomegaly, no splenomegaly, soft non-tender, no guarding, and no rebound tenderness  Pelvic: Not performed.    Data " Review:  The following data was reviewed by: Amelia Ritchie MD on 08/28/2024:     Labs:  Urine Culture - Urine, Urine, Clean Catch (07/17/2024 14:46)  Urinalysis With Microscopic - Urine, Clean Catch (07/17/2024 14:46)  Microscopic Examination - (07/17/2024 14:46)  Imaging:  US Renal Bilateral (08/15/2024 10:16)   CT Abdomen Pelvis Without Contrast (10/08/2021 17:01)   US Renal Bilateral (03/22/2018 13:43)   Medical Records:  Megan E Collett, APRN  Advanced Practice Registered Nurse  Specialty: Neurology     Progress Notes     Signed     Encounter Date: 4/9/2024  Note Received: 08/28/24 1216     Signed       Subjective       Estrella Recio is a 63 y.o. female            Chief Complaint   Patient presents with    Memory Loss    Sleep Apnea         I have reviewed and/or updated the following:   Tobacco  Allergies  Meds  Problems  Med Hx  Surg Hx  Fam Hx           History:  Ms. Recio was referred for memory loss.    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  She had a day in 2021 where she was sent home for talking incoherently.  She was put in the hospital and does not remember a couple of days.  She had kidney infection.  She feels she never got back to baseline after this.  She had to go to and retire in 2022 due to memory issues.  She does substitute teach.    She will notince she says the wrong work.  She was talking to her sister this week and said halo which was not part of sentence.  She will have word finding difficulties.  She will forget things she is told.  She was suppose to sub one day and forgot.  She does sleep okay.  She has sleep apnea and wears CPAP.   She cannot remember to pay bills and has auto pay.  She does okay with meds and has marked her meds morning and night.  She does struggle with receipes that she use to make.  She does feel mood is good.       She does not have migraine currently but had migraine in past.      Today:  Has seen sleep specialist and has been diagnosed with complex sleep apnea  (stopped breathing 61 times per hour). She has been on a Bipap for about a month. Has not noticed any cognitive improvements.   Sleep medicine is prescribing her Modafinil.   States she recently had cognitive testing at .         Neurocognitive testing 3/14/23.  Impression:  Ms. Recio's neurocognitive profile is consistent with mild cognitive impairment, characterized by borderline impaired overall memory and highly variable verbal learning and encoding. Significant deficits were noted in verbal (i.e. semantic) fluency. I suspect her symptoms are exacerbated by depression, chronic pain, difficulty sleeping, fluctuations in attention and psychosocial stressors.     Recommendations:     1.         It is recommended that Ms. Recio undergo a re-evaluation in one year to monitor any further cognitive changes. This serves as baseline cognitive testing.      2.   Ms. Recio should continue psychopharmacological therapy to address anxiety and depression.      3.   It is recommended that you consider initiation of Modafinil to address fluctuations in Ms. Recio's attention and her daytime fatigue. If Modafinil does not provide relief, I would consider   Initiation of Donepezil. Given that prescriptive privileges lie outside my scope of practice, I will respectfully defer this to Deyanira Pena MD.      Anxiety  Symptoms include decreased concentration and nervous/anxious behavior.               Review of Systems   Constitutional: Positive for fatigue.   Gastrointestinal: Positive for diarrhea.   Musculoskeletal: Positive for arthralgias.   Neurological: Positive for numbness.   Psychiatric/Behavioral: Positive for decreased concentration. The patient is nervous/anxious.    All other systems reviewed and are negative.            Radiology Results (last 7 days)      ** No results found for the last 168 hours. **                  No visits with results within 1 Day(s) from this visit.   Latest known visit with results is:    Historical Encounter on 10/04/2021   Component Date Value Ref Range Status    Lipase 10/04/2021 79  73 - 393 Units/Liter Final    Lactic Acid Level (mmol/L) 10/04/2021 1.3  0.5 - 1.9 mMole/Liter Final    Procalcitonin 10/04/2021 < 0.05 (L)  0.05 - 0.5 ng/mL Final     Comment: PCT < or = 0.5 ng/mL - Low risk for progression to severe  systemic infection (severe sepsis/  septic shock).  CAUTION:  PCT levels  below 0.5 ng/mL do not exclude an  infection, because localized  infections (without systemic signs)  may be associated with such low  levels of PCT.  If PCT is measured very early after a  bacterial challenge (usually <6 hrs),  these values may still be low.  In  this case, PCT should be re-assessed  6-24 hours later.  PCT >0.5 to 2 ng/mL -  Moderate risk for progression to  severe systemic infection (severe  sepsis/septic shock).  The patient should be closely  monitored both clinically and by  re-assessing PCT within 6-24 hours.  PCT >2 ng/mL         - High risk for progression to severe  systemic infection (sever sepsis/  septic shock).       Albumin 10/04/2021 3.9  3.4 - 5.0 Gram/dL Final    Alkaline Phosphatase 10/04/2021 78  50 - 136 Units/Liter Final    Creatinine 10/04/2021 0.92  0.55 - 1.02 mg/dL Final    CO2 10/04/2021 30  21 - 32 mMole/Liter Final    Globulin 10/04/2021 4.1  0.4 - 4.9 mg/dL Final    eGFR Non  (mL/min/1.73m2) 10/04/2021 ABOVE 60  >60 Final    Osmolality Calc 10/04/2021 279  260 - 320 Final    Total Bilirubin 10/04/2021 0.4  0.2 - 1.0 mg/dL Final    Sodium 10/04/2021 139  136 - 145 mMole/Liter Final    Calcium 10/04/2021 8.8  8.5 - 10.1 mg/dL Final    AST 10/04/2021 11 (L)  15 - 37 Units/Liter Final    Glucose 10/04/2021 155 (H)  74 - 99 Final    Potassium 10/04/2021 4.1  3.5 - 5.1 mMole/Liter Final    Protein, Total 10/04/2021 8.0  6.4 - 8.2 Gram/dL Final    ALT 10/04/2021 19  12 - 78 Units/Liter Final    BUN 10/04/2021 10  7 - 18 mg/dL Final    Chloride 10/04/2021 99  98 - 107  "mMole/Liter Final    RBC 10/04/2021 4.57  3.93 - 5.22 Final    MCHC 10/04/2021 32.4  32.2 - 35.5 Final    % Monos 10/04/2021 4.6 (L)  4.7 - 12.5 % Final    Hemoglobin 10/04/2021 12.7  11.2 - 15.7 Final    RDW 10/04/2021 12.6  11.7 - 14.4 % Final    % Eos 10/04/2021 0.8  0.7 - 5.8 % Final    Hematocrit 10/04/2021 39.2  34.1 - 44.9 % Final    Platelets 10/04/2021 287  182 - 369 Final    % Baso 10/04/2021 0.4  0.1 - 1.2 % Final    MCV 10/04/2021 86  79 - 95 Final    % Neutros 10/04/2021 82.5 (H)  34.0 - 71.1 Final    Immature Granulocytes-Relative 10/04/2021 0.3  0.0 - 0.5 % Final    WBC 10/04/2021 10.7 (H)  4.0 - 10.0 Final    MCH 10/04/2021 27.8  25.6 - 32.2 pg Final    % Lymphs 10/04/2021 11.4 (L)  19.3 - 51.7 % Final            Objective       /68   Pulse 90   Ht 1.651 m (5' 5\")   Wt 76.9 kg (169 lb 8 oz)   SpO2 95%   BMI 28.21 kg/m²      Neurologic Exam      Mental Status   Oriented to person, place, and time.   Registration of memory: recalls 2/5.   Attention: decreased. Concentration: normal.   Speech: speech is normal   Level of consciousness: alert  Knowledge: good.   Normal comprehension.   MOCA 23/30      Cranial Nerves   Cranial nerves II through XII intact.      CN III, IV, VI   Pupils are equal, round, and reactive to light.     Motor Exam   Muscle bulk: normal  Overall muscle tone: normal     Strength   Strength 5/5 throughout.      Sensory Exam   Light touch normal.   Right leg vibration: decreased from ankle  Left leg vibration: decreased from ankle     Gait, Coordination, and Reflexes      Gait  Gait: normal     Tremor   Resting tremor: absent  Intention tremor: absent  Action tremor: absent     Reflexes   Right brachioradialis: 1+  Left brachioradialis: 1+  Right biceps: 1+  Left biceps: 1+  Right triceps: 1+  Left triceps: 1+  Right patellar: 1+  Left patellar: 1+  Right achilles: 0  Left achilles: 0  Right : 1+  Left : 1+        Physical Exam  Vitals and nursing note reviewed. "   Constitutional:       Appearance: Normal appearance.   Eyes:      Conjunctiva/sclera: Conjunctivae normal.      Pupils: Pupils are equal, round, and reactive to light.   Cardiovascular:      Rate and Rhythm: Normal rate and regular rhythm.   Pulmonary:      Effort: Pulmonary effort is normal.      Breath sounds: Normal breath sounds.   Musculoskeletal:      Cervical back: Normal range of motion and neck supple.   Skin:     General: Skin is warm and dry.   Neurological:      Mental Status: She is alert and oriented to person, place, and time.      Cranial Nerves: Cranial nerves 2-12 are intact.      Motor: Motor strength is normal.     Gait: Gait is intact.      Deep Tendon Reflexes:      Reflex Scores:       Tricep reflexes are 1+ on the right side and 1+ on the left side.       Bicep reflexes are 1+ on the right side and 1+ on the left side.       Brachioradialis reflexes are 1+ on the right side and 1+ on the left side.       Patellar reflexes are 1+ on the right side and 1+ on the left side.       Achilles reflexes are 0 on the right side and 0 on the left side.  Psychiatric:         Mood and Affect: Mood normal.         Speech: Speech normal.         Behavior: Behavior normal.         Thought Content: Thought content normal.         Judgment: Judgment normal.         September 2022.  MRI brain.  Mild cerebral atrophy and white matter changes     Assessment       1. Memory loss    2. Sleep apnea, unspecified type             Plan    Has recently been diagnosed with sleep apnea.  I think a lot of her cognitive issues are due to her chronic untreated sleep apnea. She is new to a Bipap over the last month.   She did have cognitive testing done at  so today I will refrain from re-scheduling testing with Stephanie Srivastava. Will see how she's doing cognitively in 6 months and consider re-testing with Stephanie if needed.  She may continue modafinil which is prescribed by sleep specialist.  Stopped donepezil due to headaches.       F/u 6 months.      Diagnoses and all orders for this visit:  Memory loss  Sleep apnea, unspecified type        Return in about 6 months (around 10/9/2024).               Electronically signed by Megan E Collett, APRN at 04/09/24 1140      Office Visit on 4/9/2024 Note shared with patient in the original system    Received From: [facility]     Discharge Summary by Jeanette Boyer DO (10/11/2021 13:20)   Assessment and Plan   1. Cystocele, midline  Patient reports improvement in her symptoms.  She is to continue the use of her estrogen cream as discussed.    2. Rectocele  Discussed with the patient the need to keep stools soft and no straining with defecation.  Patient does have an appointment scheduled with GI.    3. Postmenopausal atrophic vaginitis  Patient is to continue the use of her estrogen cream as previously given.    4. Constipation, unspecified constipation type  Stressed to the patient the need for aggressive management of her constipation as noted.    5. Difficulty voiding  Patient with improvement in her difficulty voiding.  Repeat clean-catch UA culture and sensitivity today.  Patient to call for her culture results.    6. Urinary urgency  Patient with improvement in her urinary urgency.  She is to continue the use of her estrogen cream.    7. Urinary incontinence, unspecified type  Patient with improvement in her urinary incontinence.  Instructions and precautions have been given.  Patient to continue the use of her estrogen cream.    8. Incontinence of feces, unspecified fecal incontinence type  Patient with intermittent episodes of incontinence of feces.  Repeat clean-catch UA culture and sensitivity today.  Patient to keep her follow-up appointment with GI.    9. Pelvic pressure in female  With improvement in her pelvic pressure as noted.  Instructions precautions have been given.  Patient to follow-up if worsening and/or changes in her symptoms.    10. Right renal  atrophy  Patient with mild right renal atrophy.  There is a significant change from her previous renal ultrasound in 2018 as noted.  Patient to keep her follow-up appointment with nephrology as discussed.    Follow Up/Instructions:  Follow up as noted.  Patient was given instructions and counseling regarding her condition or for health maintenance advice. Please see specific information pulled into the AVS if appropriate.     Note: Speech recognition transcription software may have been used to dictate portions of this document.  An attempt at proofreading has been made though minor errors in transcription may still be present.    This note was electronically signed.  Amelia Ritchie M.D.

## 2024-08-29 DIAGNOSIS — R39.15 URINARY URGENCY: Primary | ICD-10-CM

## 2024-08-30 LAB
APPEARANCE UR: ABNORMAL
BACTERIA #/AREA URNS HPF: ABNORMAL /[HPF]
BACTERIA UR CULT: NORMAL
BACTERIA UR CULT: NORMAL
BILIRUB UR QL STRIP: NEGATIVE
CASTS URNS MICRO: ABNORMAL
CASTS URNS QL MICRO: PRESENT /LPF
COLOR UR: YELLOW
CRYSTALS URNS MICRO: ABNORMAL
EPI CELLS #/AREA URNS HPF: >10 /HPF (ref 0–10)
GLUCOSE UR QL STRIP: NEGATIVE
HGB UR QL STRIP: NEGATIVE
KETONES UR QL STRIP: ABNORMAL
LEUKOCYTE ESTERASE UR QL STRIP: ABNORMAL
MICRO URNS: ABNORMAL
MUCOUS THREADS URNS QL MICRO: PRESENT
NITRITE UR QL STRIP: NEGATIVE
PH UR STRIP: 5 [PH] (ref 5–7.5)
PROT UR QL STRIP: ABNORMAL
RBC #/AREA URNS HPF: ABNORMAL /HPF (ref 0–2)
SP GR UR STRIP: >=1.03 (ref 1–1.03)
UNIDENT CRYS URNS QL MICRO: PRESENT
UROBILINOGEN UR STRIP-MCNC: 0.2 MG/DL (ref 0.2–1)
WBC #/AREA URNS HPF: ABNORMAL /HPF (ref 0–5)

## 2024-08-31 PROBLEM — R30.0 DYSURIA: Status: ACTIVE | Noted: 2024-08-31

## 2024-10-07 ENCOUNTER — TRANSCRIBE ORDERS (OUTPATIENT)
Dept: LAB | Facility: HOSPITAL | Age: 64
End: 2024-10-07
Payer: OTHER GOVERNMENT

## 2024-10-07 ENCOUNTER — LAB (OUTPATIENT)
Dept: LAB | Facility: HOSPITAL | Age: 64
End: 2024-10-07
Payer: MEDICARE

## 2024-10-07 DIAGNOSIS — N18.9 CHRONIC KIDNEY DISEASE, UNSPECIFIED CKD STAGE: Primary | ICD-10-CM

## 2024-10-07 DIAGNOSIS — N18.9 CHRONIC KIDNEY DISEASE, UNSPECIFIED CKD STAGE: ICD-10-CM

## 2024-10-07 LAB
ALBUMIN SERPL-MCNC: 4.7 G/DL (ref 3.5–5.2)
ANION GAP SERPL CALCULATED.3IONS-SCNC: 11 MMOL/L (ref 5–15)
BACTERIA UR QL AUTO: ABNORMAL /HPF
BASOPHILS # BLD AUTO: 0.04 10*3/MM3 (ref 0–0.2)
BASOPHILS NFR BLD AUTO: 0.6 % (ref 0–1.5)
BILIRUB UR QL STRIP: NEGATIVE
BUN SERPL-MCNC: 17 MG/DL (ref 8–23)
BUN/CREAT SERPL: 15 (ref 7–25)
CALCIUM SPEC-SCNC: 10.2 MG/DL (ref 8.6–10.5)
CHLORIDE SERPL-SCNC: 99 MMOL/L (ref 98–107)
CLARITY UR: ABNORMAL
CO2 SERPL-SCNC: 28 MMOL/L (ref 22–29)
COLOR UR: ABNORMAL
CREAT SERPL-MCNC: 1.13 MG/DL (ref 0.57–1)
CREAT UR-MCNC: 121.4 MG/DL
DEPRECATED RDW RBC AUTO: 47.3 FL (ref 37–54)
EGFRCR SERPLBLD CKD-EPI 2021: 54.1 ML/MIN/1.73
EOSINOPHIL # BLD AUTO: 0.16 10*3/MM3 (ref 0–0.4)
EOSINOPHIL NFR BLD AUTO: 2.3 % (ref 0.3–6.2)
ERYTHROCYTE [DISTWIDTH] IN BLOOD BY AUTOMATED COUNT: 13.4 % (ref 12.3–15.4)
GLUCOSE SERPL-MCNC: 101 MG/DL (ref 65–99)
GLUCOSE UR STRIP-MCNC: NEGATIVE MG/DL
HCT VFR BLD AUTO: 37.5 % (ref 34–46.6)
HGB BLD-MCNC: 12.1 G/DL (ref 12–15.9)
HGB UR QL STRIP.AUTO: NEGATIVE
HYALINE CASTS UR QL AUTO: ABNORMAL /LPF
IMM GRANULOCYTES # BLD AUTO: 0.03 10*3/MM3 (ref 0–0.05)
IMM GRANULOCYTES NFR BLD AUTO: 0.4 % (ref 0–0.5)
KETONES UR QL STRIP: NEGATIVE
LEUKOCYTE ESTERASE UR QL STRIP.AUTO: NEGATIVE
LYMPHOCYTES # BLD AUTO: 1.77 10*3/MM3 (ref 0.7–3.1)
LYMPHOCYTES NFR BLD AUTO: 25.1 % (ref 19.6–45.3)
MCH RBC QN AUTO: 31 PG (ref 26.6–33)
MCHC RBC AUTO-ENTMCNC: 32.3 G/DL (ref 31.5–35.7)
MCV RBC AUTO: 96.2 FL (ref 79–97)
MONOCYTES # BLD AUTO: 0.42 10*3/MM3 (ref 0.1–0.9)
MONOCYTES NFR BLD AUTO: 6 % (ref 5–12)
NEUTROPHILS NFR BLD AUTO: 4.63 10*3/MM3 (ref 1.7–7)
NEUTROPHILS NFR BLD AUTO: 65.6 % (ref 42.7–76)
NITRITE UR QL STRIP: NEGATIVE
NRBC BLD AUTO-RTO: 0 /100 WBC (ref 0–0.2)
PH UR STRIP.AUTO: 5.5 [PH] (ref 5–8)
PHOSPHATE SERPL-MCNC: 2.5 MG/DL (ref 2.5–4.5)
PLATELET # BLD AUTO: 297 10*3/MM3 (ref 140–450)
PMV BLD AUTO: 9.6 FL (ref 6–12)
POTASSIUM SERPL-SCNC: 4.9 MMOL/L (ref 3.5–5.2)
PROT ?TM UR-MCNC: 14.9 MG/DL
PROT UR QL STRIP: NEGATIVE
RBC # BLD AUTO: 3.9 10*6/MM3 (ref 3.77–5.28)
RBC # UR STRIP: ABNORMAL /HPF
REF LAB TEST METHOD: ABNORMAL
SODIUM SERPL-SCNC: 138 MMOL/L (ref 136–145)
SP GR UR STRIP: 1.02 (ref 1–1.03)
SQUAMOUS #/AREA URNS HPF: ABNORMAL /HPF
UROBILINOGEN UR QL STRIP: ABNORMAL
WBC # UR STRIP: ABNORMAL /HPF
WBC NRBC COR # BLD AUTO: 7.05 10*3/MM3 (ref 3.4–10.8)

## 2024-10-07 PROCEDURE — 84156 ASSAY OF PROTEIN URINE: CPT

## 2024-10-07 PROCEDURE — 82570 ASSAY OF URINE CREATININE: CPT

## 2024-10-07 PROCEDURE — 80069 RENAL FUNCTION PANEL: CPT | Performed by: STUDENT IN AN ORGANIZED HEALTH CARE EDUCATION/TRAINING PROGRAM

## 2024-10-07 PROCEDURE — 36415 COLL VENOUS BLD VENIPUNCTURE: CPT | Performed by: STUDENT IN AN ORGANIZED HEALTH CARE EDUCATION/TRAINING PROGRAM

## 2024-10-07 PROCEDURE — 85025 COMPLETE CBC W/AUTO DIFF WBC: CPT

## 2024-10-07 PROCEDURE — 81001 URINALYSIS AUTO W/SCOPE: CPT | Performed by: STUDENT IN AN ORGANIZED HEALTH CARE EDUCATION/TRAINING PROGRAM

## 2024-11-05 ENCOUNTER — TRANSCRIBE ORDERS (OUTPATIENT)
Dept: ADMINISTRATIVE | Facility: HOSPITAL | Age: 64
End: 2024-11-05
Payer: OTHER GOVERNMENT

## 2024-11-05 DIAGNOSIS — I10 ESSENTIAL HYPERTENSION, MALIGNANT: Primary | ICD-10-CM

## 2025-02-18 ENCOUNTER — LAB (OUTPATIENT)
Dept: LAB | Facility: HOSPITAL | Age: 65
End: 2025-02-18
Payer: MEDICARE

## 2025-02-18 ENCOUNTER — TRANSCRIBE ORDERS (OUTPATIENT)
Dept: LAB | Facility: HOSPITAL | Age: 65
End: 2025-02-18
Payer: OTHER GOVERNMENT

## 2025-02-18 DIAGNOSIS — N18.31 CHRONIC KIDNEY DISEASE (CKD) STAGE G3A/A1, MODERATELY DECREASED GLOMERULAR FILTRATION RATE (GFR) BETWEEN 45-59 ML/MIN/1.73 SQUARE METER AND ALBUMINURIA CREATININE RATIO LESS THAN 30 MG/G (CMS/H*: Primary | ICD-10-CM

## 2025-02-18 DIAGNOSIS — E78.5 HYPERLIPIDEMIA, UNSPECIFIED HYPERLIPIDEMIA TYPE: ICD-10-CM

## 2025-02-18 DIAGNOSIS — E11.9 DIABETES MELLITUS WITHOUT COMPLICATION: ICD-10-CM

## 2025-02-18 DIAGNOSIS — M10.9 GOUT, UNSPECIFIED CAUSE, UNSPECIFIED CHRONICITY, UNSPECIFIED SITE: ICD-10-CM

## 2025-02-18 DIAGNOSIS — N18.31 CHRONIC KIDNEY DISEASE (CKD) STAGE G3A/A1, MODERATELY DECREASED GLOMERULAR FILTRATION RATE (GFR) BETWEEN 45-59 ML/MIN/1.73 SQUARE METER AND ALBUMINURIA CREATININE RATIO LESS THAN 30 MG/G (CMS/H*: ICD-10-CM

## 2025-02-18 LAB
ALBUMIN SERPL-MCNC: 4.3 G/DL (ref 3.5–5.2)
ALBUMIN UR-MCNC: 1.6 MG/DL
ANION GAP SERPL CALCULATED.3IONS-SCNC: 13.6 MMOL/L (ref 5–15)
BUN SERPL-MCNC: 15 MG/DL (ref 8–23)
BUN/CREAT SERPL: 14 (ref 7–25)
CALCIUM SPEC-SCNC: 9.3 MG/DL (ref 8.6–10.5)
CHLORIDE SERPL-SCNC: 100 MMOL/L (ref 98–107)
CHOLEST SERPL-MCNC: 166 MG/DL (ref 0–200)
CK SERPL-CCNC: 32 U/L (ref 20–180)
CO2 SERPL-SCNC: 26.4 MMOL/L (ref 22–29)
CREAT SERPL-MCNC: 1.07 MG/DL (ref 0.57–1)
CREAT UR-MCNC: 291.6 MG/DL
EGFRCR SERPLBLD CKD-EPI 2021: 57.8 ML/MIN/1.73
GLUCOSE SERPL-MCNC: 158 MG/DL (ref 65–99)
HBA1C MFR BLD: 6 % (ref 4.8–5.6)
HDLC SERPL-MCNC: 38 MG/DL (ref 40–60)
LDLC SERPL CALC-MCNC: 100 MG/DL (ref 0–100)
LDLC/HDLC SERPL: 2.52 {RATIO}
MICROALBUMIN/CREAT UR: 5.5 MG/G (ref 0–29)
PHOSPHATE SERPL-MCNC: 3.4 MG/DL (ref 2.5–4.5)
POTASSIUM SERPL-SCNC: 3.8 MMOL/L (ref 3.5–5.2)
PTH-INTACT SERPL-MCNC: 18.8 PG/ML (ref 15–65)
SODIUM SERPL-SCNC: 140 MMOL/L (ref 136–145)
TRIGL SERPL-MCNC: 162 MG/DL (ref 0–150)
URATE SERPL-MCNC: 3.1 MG/DL (ref 2.4–5.7)
VLDLC SERPL-MCNC: 28 MG/DL (ref 5–40)

## 2025-02-18 PROCEDURE — 80061 LIPID PANEL: CPT

## 2025-02-18 PROCEDURE — 83036 HEMOGLOBIN GLYCOSYLATED A1C: CPT

## 2025-02-18 PROCEDURE — 84550 ASSAY OF BLOOD/URIC ACID: CPT

## 2025-02-18 PROCEDURE — 82652 VIT D 1 25-DIHYDROXY: CPT

## 2025-02-18 PROCEDURE — 83970 ASSAY OF PARATHORMONE: CPT

## 2025-02-18 PROCEDURE — 82043 UR ALBUMIN QUANTITATIVE: CPT

## 2025-02-18 PROCEDURE — 80069 RENAL FUNCTION PANEL: CPT

## 2025-02-18 PROCEDURE — 36415 COLL VENOUS BLD VENIPUNCTURE: CPT

## 2025-02-18 PROCEDURE — 82550 ASSAY OF CK (CPK): CPT

## 2025-02-18 PROCEDURE — 82570 ASSAY OF URINE CREATININE: CPT

## 2025-02-20 LAB — 1,25(OH)2D SERPL-MCNC: 37.7 PG/ML (ref 24.8–81.5)

## 2025-02-27 ENCOUNTER — OFFICE VISIT (OUTPATIENT)
Dept: PSYCHIATRY | Facility: CLINIC | Age: 65
End: 2025-02-27
Payer: MEDICARE

## 2025-02-27 VITALS
HEART RATE: 84 BPM | HEIGHT: 65 IN | SYSTOLIC BLOOD PRESSURE: 112 MMHG | OXYGEN SATURATION: 96 % | WEIGHT: 172 LBS | DIASTOLIC BLOOD PRESSURE: 68 MMHG | BODY MASS INDEX: 28.66 KG/M2

## 2025-02-27 DIAGNOSIS — F41.9 ANXIETY: ICD-10-CM

## 2025-02-27 DIAGNOSIS — R41.840 POOR CONCENTRATION: ICD-10-CM

## 2025-02-27 DIAGNOSIS — F33.1 MODERATE EPISODE OF RECURRENT MAJOR DEPRESSIVE DISORDER: Primary | ICD-10-CM

## 2025-02-27 RX ORDER — COLCHICINE 0.6 MG/1
TABLET ORAL
COMMUNITY
Start: 2024-09-16 | End: 2025-02-27

## 2025-02-27 RX ORDER — VENLAFAXINE HYDROCHLORIDE 37.5 MG/1
37.5 CAPSULE, EXTENDED RELEASE ORAL
COMMUNITY
Start: 2024-09-13 | End: 2025-02-27

## 2025-02-27 RX ORDER — ROPINIROLE 0.5 MG/1
TABLET, FILM COATED ORAL
COMMUNITY
Start: 2024-11-13 | End: 2025-02-27

## 2025-02-27 RX ORDER — BUPROPION HYDROCHLORIDE 75 MG/1
75 TABLET ORAL EVERY MORNING
Qty: 30 TABLET | Refills: 1 | Status: SHIPPED | OUTPATIENT
Start: 2025-02-27 | End: 2025-02-27

## 2025-02-27 RX ORDER — BUPROPION HYDROCHLORIDE 75 MG/1
75 TABLET ORAL EVERY MORNING
Qty: 90 TABLET | Refills: 0 | Status: SHIPPED | OUTPATIENT
Start: 2025-02-27

## 2025-02-27 RX ORDER — VENLAFAXINE HYDROCHLORIDE 150 MG/1
1 CAPSULE, EXTENDED RELEASE ORAL DAILY
COMMUNITY
Start: 2025-02-12

## 2025-02-27 RX ORDER — POLYETHYLENE GLYCOL-3350 AND ELECTROLYTES 236; 6.74; 5.86; 2.97; 22.74 G/274.31G; G/274.31G; G/274.31G; G/274.31G; G/274.31G
POWDER, FOR SOLUTION ORAL
COMMUNITY
End: 2025-02-27

## 2025-02-27 RX ORDER — TRAZODONE HYDROCHLORIDE 50 MG/1
TABLET ORAL
COMMUNITY
Start: 2025-02-12

## 2025-02-27 RX ORDER — IPRATROPIUM BROMIDE 42 UG/1
2 SPRAY, METERED NASAL DAILY
COMMUNITY
Start: 2024-10-04
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
## 2025-03-13 ENCOUNTER — TELEPHONE (OUTPATIENT)
Dept: PSYCHIATRY | Facility: CLINIC | Age: 65
End: 2025-03-13
Payer: OTHER GOVERNMENT

## 2025-03-13 NOTE — TELEPHONE ENCOUNTER
I requested [employer] results for this patient. [employer] sent us a fax that this patient was not found in their system for having one done.

## 2025-03-27 ENCOUNTER — OFFICE VISIT (OUTPATIENT)
Dept: PSYCHIATRY | Facility: CLINIC | Age: 65
End: 2025-03-27
Payer: MEDICARE

## 2025-03-27 VITALS
OXYGEN SATURATION: 97 % | SYSTOLIC BLOOD PRESSURE: 106 MMHG | HEIGHT: 65 IN | WEIGHT: 169 LBS | DIASTOLIC BLOOD PRESSURE: 74 MMHG | HEART RATE: 92 BPM | BODY MASS INDEX: 28.16 KG/M2

## 2025-03-27 DIAGNOSIS — F33.1 MODERATE EPISODE OF RECURRENT MAJOR DEPRESSIVE DISORDER: ICD-10-CM

## 2025-03-27 DIAGNOSIS — R41.840 POOR CONCENTRATION: Primary | ICD-10-CM

## 2025-03-27 DIAGNOSIS — F41.9 ANXIETY: ICD-10-CM

## 2025-03-27 RX ORDER — ROPINIROLE 0.5 MG/1
TABLET, FILM COATED ORAL
COMMUNITY
Start: 2025-03-04
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
## 2025-04-18 ENCOUNTER — TELEMEDICINE (OUTPATIENT)
Dept: PSYCHIATRY | Facility: CLINIC | Age: 65
End: 2025-04-18
Payer: OTHER GOVERNMENT

## 2025-04-18 DIAGNOSIS — F41.1 GAD (GENERALIZED ANXIETY DISORDER): ICD-10-CM

## 2025-04-18 DIAGNOSIS — F90.0 ATTENTION DEFICIT HYPERACTIVITY DISORDER (ADHD), PREDOMINANTLY INATTENTIVE TYPE: Primary | ICD-10-CM

## 2025-04-18 DIAGNOSIS — F33.1 MODERATE EPISODE OF RECURRENT MAJOR DEPRESSIVE DISORDER: ICD-10-CM

## 2025-04-18 DIAGNOSIS — G47.09 OTHER INSOMNIA: ICD-10-CM

## 2025-04-18 DIAGNOSIS — R41.840 POOR CONCENTRATION: ICD-10-CM

## 2025-04-18 RX ORDER — B-COMPLEX WITH VITAMIN C
250 TABLET ORAL DAILY
Qty: 30 TABLET | Refills: 0 | Status: SHIPPED | OUTPATIENT
Start: 2025-04-18

## 2025-04-18 RX ORDER — METHYLPHENIDATE HYDROCHLORIDE 10 MG/1
10 TABLET ORAL 2 TIMES DAILY
Qty: 60 TABLET | Refills: 0 | Status: SHIPPED | OUTPATIENT
Start: 2025-04-18 | End: 2026-04-18
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
## 2025-05-16 ENCOUNTER — TELEMEDICINE (OUTPATIENT)
Dept: PSYCHIATRY | Facility: CLINIC | Age: 65
End: 2025-05-16
Payer: MEDICARE

## 2025-05-16 DIAGNOSIS — R41.840 POOR CONCENTRATION: ICD-10-CM

## 2025-05-16 DIAGNOSIS — F41.1 GAD (GENERALIZED ANXIETY DISORDER): ICD-10-CM

## 2025-05-16 DIAGNOSIS — G47.09 OTHER INSOMNIA: ICD-10-CM

## 2025-05-16 DIAGNOSIS — F33.1 MODERATE EPISODE OF RECURRENT MAJOR DEPRESSIVE DISORDER: Primary | ICD-10-CM

## 2025-05-16 DIAGNOSIS — F90.0 ATTENTION DEFICIT HYPERACTIVITY DISORDER (ADHD), PREDOMINANTLY INATTENTIVE TYPE: ICD-10-CM

## 2025-05-16 PROCEDURE — 99214 OFFICE O/P EST MOD 30 MIN: CPT | Performed by: PSYCHIATRY & NEUROLOGY

## 2025-05-16 PROCEDURE — 1160F RVW MEDS BY RX/DR IN RCRD: CPT | Performed by: PSYCHIATRY & NEUROLOGY

## 2025-05-16 PROCEDURE — 1159F MED LIST DOCD IN RCRD: CPT | Performed by: PSYCHIATRY & NEUROLOGY

## 2025-05-16 RX ORDER — VENLAFAXINE HYDROCHLORIDE 150 MG/1
150 CAPSULE, EXTENDED RELEASE ORAL DAILY
Qty: 30 CAPSULE | Refills: 0 | Status: SHIPPED | OUTPATIENT
Start: 2025-05-16

## 2025-05-16 RX ORDER — B-COMPLEX WITH VITAMIN C
250 TABLET ORAL DAILY
Qty: 30 TABLET | Refills: 2 | Status: SHIPPED | OUTPATIENT
Start: 2025-05-16

## 2025-05-16 RX ORDER — VENLAFAXINE HYDROCHLORIDE 75 MG/1
75 CAPSULE, EXTENDED RELEASE ORAL DAILY
Qty: 30 CAPSULE | Refills: 2 | Status: SHIPPED | OUTPATIENT
Start: 2025-05-16 | End: 2026-05-16

## 2025-05-16 RX ORDER — TRAZODONE HYDROCHLORIDE 50 MG/1
50 TABLET ORAL NIGHTLY
Qty: 30 TABLET | Refills: 2 | Status: SHIPPED | OUTPATIENT
Start: 2025-05-16

## 2025-05-16 RX ORDER — METHYLPHENIDATE HYDROCHLORIDE 20 MG/1
20 TABLET ORAL 2 TIMES DAILY
Qty: 60 TABLET | Refills: 0 | Status: SHIPPED | OUTPATIENT
Start: 2025-05-16 | End: 2026-05-16
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
## 2025-06-27 DIAGNOSIS — R41.840 POOR CONCENTRATION: ICD-10-CM

## 2025-06-27 DIAGNOSIS — F90.0 ATTENTION DEFICIT HYPERACTIVITY DISORDER (ADHD), PREDOMINANTLY INATTENTIVE TYPE: ICD-10-CM

## 2025-06-27 RX ORDER — METHYLPHENIDATE HYDROCHLORIDE 20 MG/1
20 TABLET ORAL 2 TIMES DAILY
Qty: 60 TABLET | Refills: 0 | Status: SHIPPED | OUTPATIENT
Start: 2025-06-27 | End: 2026-06-27

## 2025-06-30 ENCOUNTER — OFFICE VISIT (OUTPATIENT)
Dept: INTERNAL MEDICINE | Facility: CLINIC | Age: 65
End: 2025-06-30
Payer: MEDICARE

## 2025-06-30 VITALS
WEIGHT: 160 LBS | HEART RATE: 90 BPM | SYSTOLIC BLOOD PRESSURE: 102 MMHG | HEIGHT: 65 IN | DIASTOLIC BLOOD PRESSURE: 60 MMHG | OXYGEN SATURATION: 98 % | RESPIRATION RATE: 16 BRPM | BODY MASS INDEX: 26.66 KG/M2 | TEMPERATURE: 98.9 F

## 2025-06-30 DIAGNOSIS — I10 BENIGN ESSENTIAL HYPERTENSION: Primary | ICD-10-CM

## 2025-06-30 DIAGNOSIS — E03.8 ADULT ONSET HYPOTHYROIDISM: ICD-10-CM

## 2025-06-30 DIAGNOSIS — N18.31 STAGE 3A CHRONIC KIDNEY DISEASE: ICD-10-CM

## 2025-06-30 DIAGNOSIS — E11.9 DIABETES MELLITUS WITHOUT COMPLICATION: ICD-10-CM

## 2025-06-30 DIAGNOSIS — E78.2 MIXED HYPERLIPIDEMIA: ICD-10-CM

## 2025-06-30 RX ORDER — COLCHICINE 0.6 MG/1
0.6 TABLET ORAL 2 TIMES DAILY
COMMUNITY
Start: 2025-06-18

## 2025-06-30 RX ORDER — LEVOTHYROXINE SODIUM 50 UG/1
50 TABLET ORAL DAILY
COMMUNITY
End: 2025-07-01 | Stop reason: SDUPTHER

## 2025-06-30 RX ORDER — LISINOPRIL 5 MG/1
5 TABLET ORAL DAILY
Qty: 90 TABLET | Refills: 1 | Status: SHIPPED | OUTPATIENT
Start: 2025-06-30
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
## 2025-07-01 LAB — TSH SERPL DL<=0.005 MIU/L-ACNC: 2.37 UIU/ML (ref 0.27–4.2)

## 2025-07-01 RX ORDER — LEVOTHYROXINE SODIUM 50 UG/1
50 TABLET ORAL DAILY
Qty: 90 TABLET | Refills: 1 | Status: SHIPPED | OUTPATIENT
Start: 2025-07-01

## 2025-07-11 ENCOUNTER — PATIENT ROUNDING (BHMG ONLY) (OUTPATIENT)
Dept: INTERNAL MEDICINE | Facility: CLINIC | Age: 65
End: 2025-07-11
Payer: MEDICARE

## 2025-07-11 NOTE — PROGRESS NOTES
A [employer] message has been sent to patient for PATIENT ROUNDING with [facility].

## 2025-08-01 ENCOUNTER — TELEMEDICINE (OUTPATIENT)
Dept: PSYCHIATRY | Facility: CLINIC | Age: 65
End: 2025-08-01
Payer: MEDICARE

## 2025-08-01 DIAGNOSIS — F33.1 MODERATE EPISODE OF RECURRENT MAJOR DEPRESSIVE DISORDER: ICD-10-CM

## 2025-08-01 DIAGNOSIS — G47.09 OTHER INSOMNIA: ICD-10-CM

## 2025-08-01 DIAGNOSIS — F90.0 ATTENTION DEFICIT HYPERACTIVITY DISORDER (ADHD), PREDOMINANTLY INATTENTIVE TYPE: Primary | ICD-10-CM

## 2025-08-01 DIAGNOSIS — F41.1 GAD (GENERALIZED ANXIETY DISORDER): ICD-10-CM

## 2025-08-01 PROCEDURE — 1160F RVW MEDS BY RX/DR IN RCRD: CPT | Performed by: PSYCHIATRY & NEUROLOGY

## 2025-08-01 PROCEDURE — 1159F MED LIST DOCD IN RCRD: CPT | Performed by: PSYCHIATRY & NEUROLOGY

## 2025-08-01 PROCEDURE — 99214 OFFICE O/P EST MOD 30 MIN: CPT | Performed by: PSYCHIATRY & NEUROLOGY

## 2025-08-01 RX ORDER — METHYLPHENIDATE HYDROCHLORIDE 20 MG/1
20 TABLET ORAL DAILY
Qty: 30 TABLET | Refills: 0 | Status: SHIPPED | OUTPATIENT
Start: 2025-08-01 | End: 2026-08-01

## 2025-08-01 RX ORDER — METHYLPHENIDATE HYDROCHLORIDE 54 MG/1
54 TABLET ORAL EVERY MORNING
Qty: 30 TABLET | Refills: 0 | Status: SHIPPED | OUTPATIENT
Start: 2025-08-01

## 2025-08-01 RX ORDER — VENLAFAXINE HYDROCHLORIDE 150 MG/1
150 CAPSULE, EXTENDED RELEASE ORAL DAILY
Qty: 30 CAPSULE | Refills: 0 | Status: SHIPPED | OUTPATIENT
Start: 2025-08-01

## 2025-08-01 RX ORDER — TRAZODONE HYDROCHLORIDE 50 MG/1
50 TABLET ORAL NIGHTLY
Qty: 30 TABLET | Refills: 2 | Status: SHIPPED | OUTPATIENT
Start: 2025-08-01

## 2025-08-01 RX ORDER — VENLAFAXINE HYDROCHLORIDE 75 MG/1
75 CAPSULE, EXTENDED RELEASE ORAL DAILY
Qty: 30 CAPSULE | Refills: 2 | Status: SHIPPED | OUTPATIENT
Start: 2025-08-01 | End: 2026-08-01

## 2025-08-14 ENCOUNTER — OFFICE VISIT (OUTPATIENT)
Dept: OBSTETRICS AND GYNECOLOGY | Facility: CLINIC | Age: 65
End: 2025-08-14
Payer: MEDICARE

## 2025-08-14 VITALS
WEIGHT: 162 LBS | DIASTOLIC BLOOD PRESSURE: 60 MMHG | SYSTOLIC BLOOD PRESSURE: 118 MMHG | BODY MASS INDEX: 26.99 KG/M2 | HEIGHT: 65 IN

## 2025-08-14 DIAGNOSIS — N81.11 CYSTOCELE, MIDLINE: ICD-10-CM

## 2025-08-14 DIAGNOSIS — N95.2 POSTMENOPAUSAL ATROPHIC VAGINITIS: ICD-10-CM

## 2025-08-14 DIAGNOSIS — N81.6 RECTOCELE: ICD-10-CM

## 2025-08-14 DIAGNOSIS — R39.9 URINARY TRACT INFECTION SYMPTOMS: ICD-10-CM

## 2025-08-14 DIAGNOSIS — R39.198 DIFFICULTY VOIDING: ICD-10-CM

## 2025-08-14 DIAGNOSIS — R19.09 GROIN MASS IN FEMALE: ICD-10-CM

## 2025-08-14 DIAGNOSIS — N95.1 MENOPAUSAL SYMPTOMS: ICD-10-CM

## 2025-08-14 DIAGNOSIS — R32 URINARY INCONTINENCE, UNSPECIFIED TYPE: ICD-10-CM

## 2025-08-14 DIAGNOSIS — R10.2 PELVIC PRESSURE IN FEMALE: Primary | ICD-10-CM

## 2025-08-14 DIAGNOSIS — R15.9 INCONTINENCE OF FECES, UNSPECIFIED FECAL INCONTINENCE TYPE: ICD-10-CM

## 2025-08-14 DIAGNOSIS — Z79.890 HORMONE REPLACEMENT THERAPY (HRT): ICD-10-CM

## 2025-08-14 DIAGNOSIS — K59.00 CONSTIPATION, UNSPECIFIED CONSTIPATION TYPE: ICD-10-CM

## 2025-08-14 PROCEDURE — 99459 PELVIC EXAMINATION: CPT | Performed by: OBSTETRICS & GYNECOLOGY

## 2025-08-14 PROCEDURE — 99214 OFFICE O/P EST MOD 30 MIN: CPT | Performed by: OBSTETRICS & GYNECOLOGY

## 2025-08-14 PROCEDURE — 1160F RVW MEDS BY RX/DR IN RCRD: CPT | Performed by: OBSTETRICS & GYNECOLOGY

## 2025-08-14 PROCEDURE — 1159F MED LIST DOCD IN RCRD: CPT | Performed by: OBSTETRICS & GYNECOLOGY

## 2025-08-14 RX ORDER — ESTRADIOL 0.07 MG/D
1 FILM, EXTENDED RELEASE TRANSDERMAL 2 TIMES WEEKLY
Qty: 24 EACH | Refills: 3 | Status: SHIPPED | OUTPATIENT
Start: 2025-08-14

## 2025-08-15 ENCOUNTER — PATIENT ROUNDING (BHMG ONLY) (OUTPATIENT)
Dept: URGENT CARE | Facility: CLINIC | Age: 65
End: 2025-08-15
Payer: MEDICARE

## 2025-08-15 ENCOUNTER — LAB (OUTPATIENT)
Dept: LAB | Facility: HOSPITAL | Age: 65
End: 2025-08-15
Payer: MEDICARE

## 2025-08-15 LAB
ALBUMIN SERPL-MCNC: 4.1 G/DL (ref 3.5–5.2)
ALBUMIN UR-MCNC: <1.2 MG/DL
ALBUMIN/GLOB SERPL: 1.5 G/DL
ALP SERPL-CCNC: 43 U/L (ref 39–117)
ALT SERPL W P-5'-P-CCNC: 11 U/L (ref 1–33)
ANION GAP SERPL CALCULATED.3IONS-SCNC: 11.6 MMOL/L (ref 5–15)
AST SERPL-CCNC: 14 U/L (ref 1–32)
BILIRUB SERPL-MCNC: 0.3 MG/DL (ref 0–1.2)
BILIRUB UR QL STRIP: NEGATIVE
BUN SERPL-MCNC: 16 MG/DL (ref 8–23)
BUN/CREAT SERPL: 13.7 (ref 7–25)
CALCIUM SPEC-SCNC: 9.6 MG/DL (ref 8.6–10.5)
CHLORIDE SERPL-SCNC: 99 MMOL/L (ref 98–107)
CHOLEST SERPL-MCNC: 157 MG/DL (ref 0–200)
CLARITY UR: ABNORMAL
CO2 SERPL-SCNC: 28.4 MMOL/L (ref 22–29)
COLOR UR: YELLOW
CREAT SERPL-MCNC: 1.17 MG/DL (ref 0.57–1)
CREAT UR-MCNC: 123.5 MG/DL
DEPRECATED RDW RBC AUTO: 44.6 FL (ref 37–54)
EGFRCR SERPLBLD CKD-EPI 2021: 51.6 ML/MIN/1.73
ERYTHROCYTE [DISTWIDTH] IN BLOOD BY AUTOMATED COUNT: 13.4 % (ref 12.3–15.4)
GLOBULIN UR ELPH-MCNC: 2.8 GM/DL
GLUCOSE SERPL-MCNC: 105 MG/DL (ref 65–99)
GLUCOSE UR STRIP-MCNC: ABNORMAL MG/DL
HBA1C MFR BLD: 5.6 % (ref 4.8–5.6)
HCT VFR BLD AUTO: 36.4 % (ref 34–46.6)
HDLC SERPL-MCNC: 40 MG/DL (ref 40–60)
HGB BLD-MCNC: 12.4 G/DL (ref 12–15.9)
HGB UR QL STRIP.AUTO: NEGATIVE
KETONES UR QL STRIP: ABNORMAL
LDLC SERPL CALC-MCNC: 91 MG/DL (ref 0–100)
LDLC/HDLC SERPL: 2.19 {RATIO}
LEUKOCYTE ESTERASE UR QL STRIP.AUTO: ABNORMAL
MCH RBC QN AUTO: 31.5 PG (ref 26.6–33)
MCHC RBC AUTO-ENTMCNC: 34.1 G/DL (ref 31.5–35.7)
MCV RBC AUTO: 92.4 FL (ref 79–97)
MICROALBUMIN/CREAT UR: NORMAL MG/G{CREAT}
NITRITE UR QL STRIP: NEGATIVE
PH UR STRIP.AUTO: 8 [PH] (ref 5–8)
PLATELET # BLD AUTO: 286 10*3/MM3 (ref 140–450)
PMV BLD AUTO: 9.4 FL (ref 6–12)
POTASSIUM SERPL-SCNC: 4.2 MMOL/L (ref 3.5–5.2)
PROT SERPL-MCNC: 6.9 G/DL (ref 6–8.5)
PROT UR QL STRIP: ABNORMAL
RBC # BLD AUTO: 3.94 10*6/MM3 (ref 3.77–5.28)
SODIUM SERPL-SCNC: 139 MMOL/L (ref 136–145)
SP GR UR STRIP: 1.02 (ref 1–1.03)
TRIGL SERPL-MCNC: 147 MG/DL (ref 0–150)
TSH SERPL DL<=0.05 MIU/L-ACNC: 1.43 UIU/ML (ref 0.27–4.2)
UROBILINOGEN UR QL STRIP: ABNORMAL
VLDLC SERPL-MCNC: 26 MG/DL (ref 5–40)
WBC NRBC COR # BLD AUTO: 7.5 10*3/MM3 (ref 3.4–10.8)

## 2025-08-15 PROCEDURE — 82043 UR ALBUMIN QUANTITATIVE: CPT | Performed by: INTERNAL MEDICINE

## 2025-08-15 PROCEDURE — 82570 ASSAY OF URINE CREATININE: CPT | Performed by: INTERNAL MEDICINE

## 2025-08-15 PROCEDURE — 80061 LIPID PANEL: CPT | Performed by: INTERNAL MEDICINE

## 2025-08-15 PROCEDURE — 84443 ASSAY THYROID STIM HORMONE: CPT | Performed by: INTERNAL MEDICINE

## 2025-08-15 PROCEDURE — 80053 COMPREHEN METABOLIC PANEL: CPT | Performed by: INTERNAL MEDICINE

## 2025-08-15 PROCEDURE — 83036 HEMOGLOBIN GLYCOSYLATED A1C: CPT | Performed by: INTERNAL MEDICINE

## 2025-08-15 PROCEDURE — 85027 COMPLETE CBC AUTOMATED: CPT | Performed by: INTERNAL MEDICINE

## 2025-08-15 PROCEDURE — 87086 URINE CULTURE/COLONY COUNT: CPT | Performed by: INTERNAL MEDICINE

## 2025-08-15 PROCEDURE — 81001 URINALYSIS AUTO W/SCOPE: CPT | Performed by: INTERNAL MEDICINE

## 2025-08-16 ENCOUNTER — HOSPITAL ENCOUNTER (EMERGENCY)
Facility: HOSPITAL | Age: 65
Discharge: HOME | End: 2025-08-16
Attending: EMERGENCY MEDICINE
Payer: MEDICARE

## 2025-08-16 VITALS
HEART RATE: 99 BPM | OXYGEN SATURATION: 97 % | WEIGHT: 162 LBS | RESPIRATION RATE: 18 BRPM | BODY MASS INDEX: 26.99 KG/M2 | TEMPERATURE: 98.3 F | DIASTOLIC BLOOD PRESSURE: 69 MMHG | HEIGHT: 65 IN | SYSTOLIC BLOOD PRESSURE: 106 MMHG
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
## 2025-08-16 LAB
APPEARANCE UR: CLEAR
BACTERIA #/AREA URNS HPF: ABNORMAL /HPF
BACTERIA UR CULT: NORMAL
BACTERIA UR CULT: NORMAL
BACTERIA UR QL AUTO: ABNORMAL /HPF
BILIRUB UR QL STRIP: NEGATIVE
CASTS URNS MICRO: ABNORMAL
COLOR UR: YELLOW
CRYSTALS URNS MICRO: ABNORMAL
EPI CELLS #/AREA URNS HPF: ABNORMAL /HPF
GLUCOSE UR QL STRIP: ABNORMAL
HGB UR QL STRIP: NEGATIVE
HYALINE CASTS UR QL AUTO: ABNORMAL /LPF
KETONES UR QL STRIP: NEGATIVE
LEUKOCYTE ESTERASE UR QL STRIP: NEGATIVE
NITRITE UR QL STRIP: NEGATIVE
PH UR STRIP: 5.5 [PH] (ref 5–8)
PROT UR QL STRIP: NEGATIVE
RBC # UR STRIP: ABNORMAL /HPF
RBC #/AREA URNS HPF: ABNORMAL /HPF
REF LAB TEST METHOD: ABNORMAL
SP GR UR STRIP: >1.03 (ref 1–1.03)
SQUAMOUS #/AREA URNS HPF: ABNORMAL /HPF
UROBILINOGEN UR STRIP-MCNC: ABNORMAL MG/DL
WBC # UR STRIP: ABNORMAL /HPF
WBC #/AREA URNS HPF: ABNORMAL /HPF

## 2025-08-16 PROCEDURE — 99283 EMERGENCY DEPT VISIT LOW MDM: CPT | Performed by: EMERGENCY MEDICINE

## 2025-08-16 RX ADMIN — AMOXICILLIN AND CLAVULANATE POTASSIUM 1 TABLET: 875; 125 TABLET, COATED ORAL at 12:12

## 2025-08-17 LAB — BACTERIA SPEC AEROBE CULT: NO GROWTH

## 2025-08-19 ENCOUNTER — TELEPHONE (OUTPATIENT)
Dept: PSYCHIATRY | Facility: CLINIC | Age: 65
End: 2025-08-19
Payer: MEDICARE

## 2025-08-22 DIAGNOSIS — F90.0 ATTENTION DEFICIT HYPERACTIVITY DISORDER (ADHD), PREDOMINANTLY INATTENTIVE TYPE: ICD-10-CM

## 2025-08-22 RX ORDER — METHYLPHENIDATE HYDROCHLORIDE 20 MG/1
20 TABLET ORAL 2 TIMES DAILY
Qty: 60 TABLET | Refills: 0 | Status: SHIPPED | OUTPATIENT
Start: 2025-08-22 | End: 2026-08-22

## 2025-08-28 DIAGNOSIS — F41.1 GAD (GENERALIZED ANXIETY DISORDER): ICD-10-CM

## 2025-08-28 DIAGNOSIS — F33.1 MODERATE EPISODE OF RECURRENT MAJOR DEPRESSIVE DISORDER: ICD-10-CM

## 2025-08-29 RX ORDER — VENLAFAXINE HYDROCHLORIDE 75 MG/1
CAPSULE, EXTENDED RELEASE ORAL
Qty: 30 CAPSULE | Refills: 2 | OUTPATIENT
Start: 2025-08-29